# Patient Record
Sex: MALE | Race: WHITE | Employment: UNEMPLOYED | URBAN - METROPOLITAN AREA
[De-identification: names, ages, dates, MRNs, and addresses within clinical notes are randomized per-mention and may not be internally consistent; named-entity substitution may affect disease eponyms.]

---

## 2017-03-08 ENCOUNTER — ED HISTORICAL/CONVERTED ENCOUNTER (OUTPATIENT)
Dept: OTHER | Age: 57
End: 2017-03-08

## 2017-11-14 ENCOUNTER — OP HISTORICAL/CONVERTED ENCOUNTER (OUTPATIENT)
Dept: OTHER | Age: 57
End: 2017-11-14

## 2017-12-21 ENCOUNTER — OP HISTORICAL/CONVERTED ENCOUNTER (OUTPATIENT)
Dept: OTHER | Age: 57
End: 2017-12-21

## 2018-09-05 ENCOUNTER — ED HISTORICAL/CONVERTED ENCOUNTER (OUTPATIENT)
Dept: OTHER | Age: 58
End: 2018-09-05

## 2020-01-21 ENCOUNTER — OFFICE VISIT (OUTPATIENT)
Dept: NEUROLOGY | Age: 60
End: 2020-01-21

## 2020-01-21 VITALS
OXYGEN SATURATION: 62 % | WEIGHT: 209 LBS | HEART RATE: 94 BPM | HEIGHT: 70 IN | BODY MASS INDEX: 29.92 KG/M2 | SYSTOLIC BLOOD PRESSURE: 125 MMHG | DIASTOLIC BLOOD PRESSURE: 62 MMHG

## 2020-01-21 DIAGNOSIS — F43.10 PTSD (POST-TRAUMATIC STRESS DISORDER): ICD-10-CM

## 2020-01-21 DIAGNOSIS — F90.0 ATTENTION DEFICIT HYPERACTIVITY DISORDER (ADHD), PREDOMINANTLY INATTENTIVE TYPE: ICD-10-CM

## 2020-01-21 DIAGNOSIS — G25.81 RLS (RESTLESS LEGS SYNDROME): ICD-10-CM

## 2020-01-21 DIAGNOSIS — E78.49 OTHER HYPERLIPIDEMIA: ICD-10-CM

## 2020-01-21 DIAGNOSIS — R41.3 MEMORY LOSS: Primary | ICD-10-CM

## 2020-01-21 DIAGNOSIS — G47.19 EXCESSIVE DAYTIME SLEEPINESS: ICD-10-CM

## 2020-01-21 RX ORDER — PAROXETINE HYDROCHLORIDE 40 MG/1
40 TABLET, FILM COATED ORAL DAILY
COMMUNITY
End: 2022-07-11 | Stop reason: ALTCHOICE

## 2020-01-21 RX ORDER — GABAPENTIN 300 MG/1
600 CAPSULE ORAL 2 TIMES DAILY
COMMUNITY

## 2020-01-21 RX ORDER — ATORVASTATIN CALCIUM 80 MG/1
80 TABLET, FILM COATED ORAL DAILY
COMMUNITY

## 2020-01-21 RX ORDER — DEXTROAMPHETAMINE SACCHARATE, AMPHETAMINE ASPARTATE, DEXTROAMPHETAMINE SULFATE AND AMPHETAMINE SULFATE 5; 5; 5; 5 MG/1; MG/1; MG/1; MG/1
20 TABLET ORAL
COMMUNITY
End: 2021-02-25

## 2020-01-21 RX ORDER — OMEPRAZOLE 40 MG/1
40 CAPSULE, DELAYED RELEASE ORAL DAILY
COMMUNITY

## 2020-01-21 RX ORDER — NALTREXONE HYDROCHLORIDE 50 MG/1
50 TABLET, FILM COATED ORAL DAILY
COMMUNITY
End: 2020-08-21

## 2020-01-21 NOTE — PATIENT INSTRUCTIONS

## 2020-01-21 NOTE — PROGRESS NOTES
NEUROLOGY HISTORY AND PHYSICAL    Name Darryn Bowser Age 56 y.o. MRN [id number]  1960     Referring Physician: Sammy Hughes MD      Chief Complaint:  narcolepsy     This is a 56 y.o. Right handed male with a medical history of hyperlipidmeia. He was diagnosed with narcolepsy at the [place]. He is enrolled in a ptsd study and he was diagnosed with poor memory and was told that it may be medication related. We discussed his attention deficit hyperactivity disorder in relationship to his PTSD as well as his medications. Discussed exercises to help him with focus including breaking up his tasks to 15 minutes and reviewing his progress. He also agreed to limit his task lists to 5 at a time and not to move on until he is completed tasks at hand. He has episodes of sudden loss of memory he may be talking and then forget what he was talking about. Impression and Plan  1. Memory loss  His Adderall. - EEG; Future    2. Attention deficit hyperactivity disorder (ADHD), predominantly inattentive type  Reduce Adderall to 10 mg daily    3. PTSD (post-traumatic stress disorder)  Continue Paxil and trazodone    4. RLS (restless legs syndrome)  Continue pramipexole    5. Other hyperlipidemia  Continue Zetia    6. Excessive daytime sleepiness  Continue Adderall        Current Outpatient Medications   Medication Sig    atorvastatin (LIPITOR) 80 mg tablet Take 80 mg by mouth daily.  gabapentin (NEURONTIN) 300 mg capsule Take 300 mg by mouth three (3) times daily.  dextroamphetamine-amphetamine (ADDERALL) 20 mg tablet Take 20 mg by mouth.  PARoxetine (PAXIL) 40 mg tablet Take 40 mg by mouth daily.  omeprazole (PRILOSEC) 40 mg capsule Take 40 mg by mouth daily.  naltrexone (DEPADE) 50 mg tablet Take 50 mg by mouth daily.  cetirizine (ZYRTEC) 10 mg tablet Take  by mouth daily.  pramipexole (MIRAPEX) 0.5 mg tablet Take 0.5 mg by mouth.  trazodone (DESYREL) 50 mg tablet Take 150 mg by mouth.  PV W-O NEW/FERROUS FUMARATE/FA (M-VIT PO) Take  by mouth. No current facility-administered medications for this visit. Past Medical History:   Diagnosis Date    CAD (coronary artery disease)     Candidal esophagitis ([facility] [address] 2008    Chronic rhinitis     Headache(784.0)     Hypercholesterolemia     RLS (restless legs syndrome)     Sleep apnea      Social History     Tobacco Use    Smoking status: Not on file   Substance Use Topics    Alcohol use: Not on file    Drug use: Not on file     Review of Systems   Constitutional: Negative for chills and fever. HENT: Negative for ear pain. Eyes: Negative for pain and discharge. Respiratory: Negative for cough and hemoptysis. Cardiovascular: Negative for chest pain and claudication. Gastrointestinal: Negative for constipation and diarrhea. Genitourinary: Negative for flank pain and hematuria. Musculoskeletal: Positive for back pain and myalgias. Skin: Negative for itching and rash. Neurological: Negative for headaches. Endo/Heme/Allergies: Negative for environmental allergies. Does not bruise/bleed easily. Psychiatric/Behavioral: Positive for depression, memory loss and suicidal ideas. Negative for hallucinations and substance abuse. The patient is nervous/anxious. Exam  Visit Vitals  /62   Pulse 94   Ht 5' 10\" (1.778 m)   Wt 209 lb (94.8 kg)   SpO2 (!) 62%   BMI 29.99 kg/m²         General: Well developed, well nourished. Patient in no apparent distress   Head: Normocephalic, atraumatic, anicteric sclera   Neck Normal ROM, No thyromegally   Lungs:  Clear to auscultation bilaterally, No wheezes or rubs   Cardiac: Regular rate and rhythm with no murmurs. Abd: Bowel sounds were audible. No tenderness on palpation   Ext: No pedal edema   Skin: Supple no rash     NeurologicExam:  Mental Status: Alert and oriented to person place and time   Speech: Fluent no aphasia or dysarthria.    Cranial Nerves:  II-XII intact   Motor: Full and symmetric strength of upper and lower ext . Normal bulk and tone. Reflexes:   Deep tendon reflexes 2+/4 and symmetric. Sensory:   Symmetric and intact    Gait:  Gait is antalgic    Tremor:   No tremor noted. Cerebellar:  Coordination intact. Neurovascular: No carotid bruits.  No JVD   60  minutes spent more than 50% spent in chart review and face to face  examination, discussion of treatment options and approach    Reviewed his last use neuropsych examinations and summarized as follows severe ADHD

## 2020-02-11 ENCOUNTER — HOSPITAL ENCOUNTER (OUTPATIENT)
Dept: NEUROLOGY | Age: 60
Discharge: HOME | End: 2020-02-11
Attending: PSYCHIATRY & NEUROLOGY
Payer: MEDICARE

## 2020-02-11 DIAGNOSIS — R41.3 MEMORY LOSS: ICD-10-CM

## 2020-02-11 PROCEDURE — 95816 EEG AWAKE AND DROWSY: CPT

## 2020-02-12 NOTE — PROCEDURES
[facility]  EEG    Name:  Soheila Esposito  MR#:  [id number]  :  1960  ACCOUNT #:  [de-identified]  DATE OF SERVICE:  2020      REFERRING PROVIDER:  Tess Loza MD    CLINICAL HISTORY:  An EEG is requested in this 69-year-old man to evaluate for epileptiform abnormalities. MEDICATIONS:  Listed as Paxil, Adderall, Neurontin, Lipitor. EEG REPORT:  This tracing is obtained during the awake state. During wakefulness, the background consists of diffuse low-voltage fast-frequency beta-wave activity. Hyperventilation is performed for 3 minutes with good effort and little alters the tracing. Intermittent photic stimulation induces symmetric posterior driving responses. Sleep is not attained    INTERPRETATION:  This EEG recorded during the awake state is normal.  No epileptiform abnormalities are seen.         Vipul Mercado MD      [id number]  D:  2020 13:51  T:  2020 22:49  JOB #:  [id number]

## 2020-04-14 ENCOUNTER — VIRTUAL VISIT (OUTPATIENT)
Dept: NEUROLOGY | Age: 60
End: 2020-04-14

## 2020-04-14 DIAGNOSIS — E78.49 OTHER HYPERLIPIDEMIA: ICD-10-CM

## 2020-04-14 DIAGNOSIS — R41.3 MEMORY LOSS: Primary | ICD-10-CM

## 2020-04-14 DIAGNOSIS — F90.0 ATTENTION DEFICIT HYPERACTIVITY DISORDER (ADHD), PREDOMINANTLY INATTENTIVE TYPE: ICD-10-CM

## 2020-04-14 DIAGNOSIS — G25.81 RLS (RESTLESS LEGS SYNDROME): ICD-10-CM

## 2020-04-14 DIAGNOSIS — F43.10 PTSD (POST-TRAUMATIC STRESS DISORDER): ICD-10-CM

## 2020-04-14 NOTE — PATIENT INSTRUCTIONS
Office Policies · Phone calls/patient messages: Please allow up to 24 hours for someone in the office to contact you about your call or message. Be mindful your provider may be out of the office or your message may require further review. We encourage you to use [employer] network for your messages as this is a faster, more efficient way to communicate with our office · Medication Refills: 
Prescription medications require up to 48 business hours to process. We encourage you to use [employer] network for your refills. For controlled medications: Please allow up to 72 business hours to process. Certain medications may require you to  a written prescription at our office. NO narcotic/controlled medications will be prescribed after 4pm Monday through Friday or on weekends · Form/Paperwork Completion: 
Please note there is a $25 fee for all paperwork completed by our providers. We ask that you allow 7-14 business days. Pre-payment is due prior to picking up/faxing the completed form. You may also download your forms to [employer] network to have your doctor print off.

## 2020-04-14 NOTE — PROGRESS NOTES
Follow-up Visit    Name Destiney Lisa Age 56 y.o. MRN [id number]  1960       Chief Complaint: Memory loss    Since he was last seen he has been attempting to employ the 15-minute documentation finds this tedious. Acknowledges that it has given him insight into reason for his distraction and the reason why he does not stay on task. He states that some days it does not work for him. Spoke with his wife his wife knowledges that after his last visit he spent 15 days implying this tactic successfully and accomplished more and less time than he has usually accomplished in the same amount of time despite spending time documenting activities. 1 thing this has not helped with is losing track the course of his conversations and I tried explaining this in light of his distraction while completing tasks. Encouraged him to stay at goal oriented as opposed to the task oriented. He said he would be more happier documenting things every 30 minutes as opposed every 15 minutes told him to try this for the future. Assesment and Plan  1. Memory loss  As discussed above    2. Attention deficit hyperactivity disorder (ADHD), predominantly inattentive type  Continue Adderall    3. PTSD (post-traumatic stress disorder)      4. RLS (restless legs syndrome)  Continue pramipexole    5. Other hyperlipidemia  Continue atorvastatin      Allergies  Aleve [naproxen sodium]     Medications  Current Outpatient Medications   Medication Sig    atorvastatin (LIPITOR) 80 mg tablet Take 80 mg by mouth daily.  gabapentin (NEURONTIN) 300 mg capsule Take 100 mg by mouth three (3) times daily.  dextroamphetamine-amphetamine (ADDERALL) 20 mg tablet Take 20 mg by mouth.  PARoxetine (PAXIL) 40 mg tablet Take 40 mg by mouth daily.  omeprazole (PRILOSEC) 40 mg capsule Take 40 mg by mouth daily.  naltrexone (DEPADE) 50 mg tablet Take 50 mg by mouth daily.  cetirizine (ZYRTEC) 10 mg tablet Take  by mouth daily.     pramipexole (MIRAPEX) 0.5 mg tablet Take 0.5 mg by mouth.  trazodone (DESYREL) 50 mg tablet Take 150 mg by mouth.  PV W-O NEW/FERROUS FUMARATE/FA (M-VIT PO) Take  by mouth. No current facility-administered medications for this visit. Medical History  Past Medical History:   Diagnosis Date    CAD (coronary artery disease)     Candidal esophagitis ([facility] [address] 2008    Chronic rhinitis     Headache(784.0)     Hypercholesterolemia     RLS (restless legs syndrome)     Sleep apnea        Review of Systems   Eyes: Negative for blurred vision and double vision. Respiratory: Negative for cough and shortness of breath. Cardiovascular: Negative for chest pain, palpitations and orthopnea. Gastrointestinal: Negative for nausea and vomiting. Neurological: Negative for dizziness and headaches. Psychiatric/Behavioral: Positive for memory loss. Negative for depression. The patient is nervous/anxious. Exam:  There were no vitals taken for this visit. General: Well developed, well nourished. Patient in no apparent distress   Head: Normocephalic, atraumatic, anicteric sclera   Neck Normal ROM, No thyromegally   Lungs:  Clear to auscultation bilaterally, No wheezes or rubs   Cardiac: Regular rate and rhythm with no murmurs. Abd: Bowel sounds were audible. No tenderness on palpation   Ext: No pedal edema   Skin: Supple no rash     NeurologicExam:  Mental Status: Alert and oriented to person place and time   Speech: Fluent no aphasia or dysarthria. Cranial Nerves:  II - XII Intact   Motor:  Full and symmetric strength of upper and lower extremities. normal bulk. Sensory:   Symmetric and intact . Gait:  Gait is balanced and fluid with normal arm swing. Tremor:   No tremor noted. Cerebellar:  Coordination intact. Neurovascular: No carotid bruits.  No JVD        45 minutes spent more than 50% spent in chart review and face to face  examination, discussion of treatment options and approach                   Rosie Ward was seen by synchronous (real-time) audio-video technology on 04/14/20. Consent:  He and/or his healthcare decision maker is aware that this patient-initiated Telehealth encounter is a billable service, with coverage as determined by his insurance carrier. He is aware that he may receive a bill and has provided verbal consent to proceed: Yes    I was in the office while conducting this encounter. Pursuant to the emergency declaration under the [facility] waiver authority and the [employer] Act, this Virtual  Visit was conducted, with patient's consent, to reduce the patient's risk of exposure to COVID-19 and provide continuity of care for an established patient. Services were provided through a video synchronous discussion virtually to substitute for in-person clinic visit.

## 2020-05-27 ENCOUNTER — ED HISTORICAL/CONVERTED ENCOUNTER (OUTPATIENT)
Dept: OTHER | Age: 60
End: 2020-05-27

## 2020-08-20 ENCOUNTER — VIRTUAL VISIT (OUTPATIENT)
Dept: NEUROLOGY | Age: 60
End: 2020-08-20
Payer: MEDICARE

## 2020-08-20 DIAGNOSIS — F43.10 PTSD (POST-TRAUMATIC STRESS DISORDER): ICD-10-CM

## 2020-08-20 DIAGNOSIS — E78.49 OTHER HYPERLIPIDEMIA: ICD-10-CM

## 2020-08-20 DIAGNOSIS — G25.81 RLS (RESTLESS LEGS SYNDROME): ICD-10-CM

## 2020-08-20 DIAGNOSIS — F90.0 ATTENTION DEFICIT HYPERACTIVITY DISORDER (ADHD), PREDOMINANTLY INATTENTIVE TYPE: Primary | ICD-10-CM

## 2020-08-20 PROCEDURE — G8419 CALC BMI OUT NRM PARAM NOF/U: HCPCS | Performed by: PSYCHIATRY & NEUROLOGY

## 2020-08-20 PROCEDURE — G9717 DOC PT DX DEP/BP F/U NT REQ: HCPCS | Performed by: PSYCHIATRY & NEUROLOGY

## 2020-08-20 PROCEDURE — G8428 CUR MEDS NOT DOCUMENT: HCPCS | Performed by: PSYCHIATRY & NEUROLOGY

## 2020-08-20 PROCEDURE — 3017F COLORECTAL CA SCREEN DOC REV: CPT | Performed by: PSYCHIATRY & NEUROLOGY

## 2020-08-20 PROCEDURE — 99214 OFFICE O/P EST MOD 30 MIN: CPT | Performed by: PSYCHIATRY & NEUROLOGY

## 2020-08-20 RX ORDER — UREA 10 %
100 LOTION (ML) TOPICAL DAILY
COMMUNITY

## 2020-08-20 RX ORDER — LIDOCAINE 50 MG/G
PATCH TOPICAL EVERY 24 HOURS
COMMUNITY

## 2020-08-20 RX ORDER — GLUCOSAMINE SULFATE 1500 MG
POWDER IN PACKET (EA) ORAL DAILY
COMMUNITY

## 2020-08-20 NOTE — PROGRESS NOTES
Follow-up Visit    Name Yany Leong Age 56 y.o. MRN [id number]  1960       Chief Complaint: Memory loss  Recently over a stomach bug for a few days. :lasted three days. His memory has not been good. He is trying to get into the habit but has a hard time focusing. He feels making lists help but some days he will stray from this. He was disturbed when it was suggested that he may be a sociopath by an acquaintance and a psychologist. I reassured him that I did not think he was and that his tendencies did not seem to lean in that direction. Assesment and Plan  1. Memory loss  As discussed above    2. Attention deficit hyperactivity disorder (ADHD), predominantly inattentive type  Continue Adderall    3. PTSD (post-traumatic stress disorder)    4. RLS (restless legs syndrome)  Continue pramipexole    5. Other hyperlipidemia  Continue atorvastatin      Allergies  Aleve [naproxen sodium]; Clindamycin; and Doxycycline     Medications  Current Outpatient Medications   Medication Sig    cyanocobalamin (Vitamin B-12) 100 mcg tablet Take 100 mcg by mouth daily.  cholecalciferol (Vitamin D3) 25 mcg (1,000 unit) cap Take  by mouth daily.  lidocaine (LIDODERM) 5 % by TransDERmal route every twenty-four (24) hours. Apply patch to the affected area for 12 hours a day and remove for 12 hours a day.  atorvastatin (LIPITOR) 80 mg tablet Take 80 mg by mouth daily.  gabapentin (NEURONTIN) 300 mg capsule Take 100 mg by mouth three (3) times daily.  dextroamphetamine-amphetamine (ADDERALL) 20 mg tablet Take 20 mg by mouth.  PARoxetine (PAXIL) 40 mg tablet Take 40 mg by mouth daily.  omeprazole (PRILOSEC) 40 mg capsule Take 40 mg by mouth daily.  cetirizine (ZYRTEC) 10 mg tablet Take  by mouth daily.  pramipexole (MIRAPEX) 0.5 mg tablet Take 0.5 mg by mouth.  trazodone (DESYREL) 50 mg tablet Take 150 mg by mouth.  PV W-O NEW/FERROUS FUMARATE/FA (M-VIT PO) Take  by mouth.     naltrexone (DEPADE) 50 mg tablet Take 50 mg by mouth daily. No current facility-administered medications for this visit. Medical History  Past Medical History:   Diagnosis Date    CAD (coronary artery disease)     Candidal es[facility]itis ([facility] [address] 2008    Chronic rhinitis     Headache(784.0)     Hypercholesterolemia     RLS (restless legs syndrome)     Sleep apnea        Review of Systems   Eyes: Negative for blurred vision and double vision. Respiratory: Negative for cough and shortness of breath. Cardiovascular: Negative for chest pain, palpitations and orthopnea. Gastrointestinal: Negative for nausea and vomiting. Neurological: Negative for dizziness and headaches. Psychiatric/Behavioral: Positive for memory loss. Negative for depression. The patient is nervous/anxious. Exam:      General: Well developed, well nourished. Patient in no apparent distress   Head: Normocephalic, atraumatic, anicteric sclera   Neck Normal ROM, No thyromegally   Lungs:  Clear to auscultation bilaterally, No wheezes or rubs   Cardiac: Regular rate and rhythm with no murmurs. Abd: Bowel sounds were audible. No tenderness on palpation   Ext: No pedal edema   Skin: Supple no rash     NeurologicExam:  Mental Status: Alert and oriented to person place and time   Speech: Fluent no aphasia or dysarthria. Cranial Nerves:  II - XII Intact   Motor:  Full and symmetric strength of upper and lower extremities. normal bulk. Sensory:   Symmetric and intact . Gait:  Gait is balanced and fluid with normal arm swing. Tremor:   No tremor noted. Cerebellar:  Coordination intact. Neurovascular: No carotid bruits. No JVD                           Aston Jones was seen by synchronous (real-time) audio-video technology on 08/20/20. Consent:  He and/or his healthcare decision maker is aware that this patient-initiated Telehealth encounter is a billable service, with coverage as determined by his insurance carrier.  He is aware that he may receive a bill and has provided verbal consent to proceed: Yes    I was in the office while conducting this encounter. Pursuant to the emergency declaration under the [facility], [facility] waiver authority and the [employer] Act, this Virtual  Visit was conducted, with patient's consent, to reduce the patient's risk of exposure to COVID-19 and provide continuity of care for an established patient. Services were provided through a video synchronous discussion virtually to substitute for in-person clinic visit.

## 2020-08-21 ENCOUNTER — HOSPITAL ENCOUNTER (OUTPATIENT)
Dept: PREADMISSION TESTING | Age: 60
Discharge: HOME | End: 2020-08-21
Payer: MEDICARE

## 2020-08-21 VITALS
SYSTOLIC BLOOD PRESSURE: 114 MMHG | DIASTOLIC BLOOD PRESSURE: 74 MMHG | BODY MASS INDEX: 28.37 KG/M2 | TEMPERATURE: 97.1 F | WEIGHT: 198.19 LBS | HEIGHT: 70 IN | HEART RATE: 62 BPM

## 2020-08-21 LAB
ATRIAL RATE: 58 BPM
BASOPHILS # BLD: 0.1 K/UL (ref 0–0.1)
BASOPHILS NFR BLD: 1 % (ref 0–1)
CALCULATED P AXIS, ECG09: 28 DEGREES
CALCULATED R AXIS, ECG10: 38 DEGREES
CALCULATED T AXIS, ECG11: 26 DEGREES
DIAGNOSIS, 93000: NORMAL
DIFFERENTIAL METHOD BLD: NORMAL
EOSINOPHIL # BLD: 0.3 K/UL (ref 0–0.4)
EOSINOPHIL NFR BLD: 3 % (ref 0–7)
ERYTHROCYTE [DISTWIDTH] IN BLOOD BY AUTOMATED COUNT: 11.7 % (ref 11.5–14.5)
HCT VFR BLD AUTO: 46.9 % (ref 36.6–50.3)
HGB BLD-MCNC: 15.3 G/DL (ref 12.1–17)
IMM GRANULOCYTES # BLD AUTO: 0 K/UL (ref 0–0.04)
IMM GRANULOCYTES NFR BLD AUTO: 0 % (ref 0–0.5)
LYMPHOCYTES # BLD: 1.2 K/UL (ref 0.8–3.5)
LYMPHOCYTES NFR BLD: 15 % (ref 12–49)
MCH RBC QN AUTO: 31.5 PG (ref 26–34)
MCHC RBC AUTO-ENTMCNC: 32.6 G/DL (ref 30–36.5)
MCV RBC AUTO: 96.5 FL (ref 80–99)
MONOCYTES # BLD: 0.5 K/UL (ref 0–1)
MONOCYTES NFR BLD: 6 % (ref 5–13)
NEUTS SEG # BLD: 5.6 K/UL (ref 1.8–8)
NEUTS SEG NFR BLD: 75 % (ref 32–75)
NRBC # BLD: 0 K/UL (ref 0–0.01)
NRBC BLD-RTO: 0 PER 100 WBC
P-R INTERVAL, ECG05: 238 MS
PLATELET # BLD AUTO: 230 K/UL (ref 150–400)
PMV BLD AUTO: 10 FL (ref 8.9–12.9)
Q-T INTERVAL, ECG07: 402 MS
QRS DURATION, ECG06: 98 MS
QTC CALCULATION (BEZET), ECG08: 394 MS
RBC # BLD AUTO: 4.86 M/UL (ref 4.1–5.7)
VENTRICULAR RATE, ECG03: 58 BPM
WBC # BLD AUTO: 7.6 K/UL (ref 4.1–11.1)

## 2020-08-21 PROCEDURE — 93005 ELECTROCARDIOGRAM TRACING: CPT

## 2020-08-21 PROCEDURE — 36415 COLL VENOUS BLD VENIPUNCTURE: CPT

## 2020-08-21 PROCEDURE — 85025 COMPLETE CBC W/AUTO DIFF WBC: CPT

## 2020-08-21 RX ORDER — MONTELUKAST SODIUM 10 MG/1
10 TABLET ORAL EVERY EVENING
COMMUNITY
End: 2021-12-07

## 2020-08-21 NOTE — PERIOP NOTES
Patient given surgical site infection information FAQs handout and hand hygiene tips sheet. Pre-operative instructions reviewed and patient verbalizes understanding of instructions. Patient has been given the opportunity to ask additional questions. PATIENT GIVEN 2 BOTTLES OF CHG SOAP TO USE DAY BEFORE SURGERY AND DOS. INSTRUCTIONS PROVIDED. Pre-Operative Instructions    DO NOT EAT OR DRINK ANYTHING AFTER MIDNIGHT THE NIGHT BEFORE SURGERY. PT INSTRUCTED TO GO TO CELL PHONE LOT DOS AND IS SCHEDULED FOR COVID TESTING.

## 2020-08-27 ENCOUNTER — HOSPITAL ENCOUNTER (OUTPATIENT)
Dept: PREADMISSION TESTING | Age: 60
Discharge: HOME | End: 2020-08-27
Payer: MEDICARE

## 2020-08-27 DIAGNOSIS — Z01.812 PRE-PROCEDURE LAB EXAM: ICD-10-CM

## 2020-08-27 PROCEDURE — 87635 SARS-COV-2 COVID-19 AMP PRB: CPT

## 2020-08-28 ENCOUNTER — ANESTHESIA EVENT (OUTPATIENT)
Dept: SURGERY | Age: 60
End: 2020-08-28
Payer: MEDICARE

## 2020-08-28 LAB — SARS-COV-2, COV2NT: NOT DETECTED

## 2020-08-28 RX ORDER — MORPHINE SULFATE 10 MG/ML
2 INJECTION, SOLUTION INTRAMUSCULAR; INTRAVENOUS
Status: CANCELLED | OUTPATIENT
Start: 2020-08-28

## 2020-08-28 RX ORDER — SODIUM CHLORIDE, SODIUM LACTATE, POTASSIUM CHLORIDE, CALCIUM CHLORIDE 600; 310; 30; 20 MG/100ML; MG/100ML; MG/100ML; MG/100ML
1000 INJECTION, SOLUTION INTRAVENOUS CONTINUOUS
Status: CANCELLED | OUTPATIENT
Start: 2020-08-28

## 2020-08-28 RX ORDER — HYDROMORPHONE HYDROCHLORIDE 1 MG/ML
0.2 INJECTION, SOLUTION INTRAMUSCULAR; INTRAVENOUS; SUBCUTANEOUS
Status: CANCELLED | OUTPATIENT
Start: 2020-08-28

## 2020-08-28 RX ORDER — MIDAZOLAM HYDROCHLORIDE 1 MG/ML
0.5 INJECTION, SOLUTION INTRAMUSCULAR; INTRAVENOUS
Status: CANCELLED | OUTPATIENT
Start: 2020-08-28

## 2020-08-28 RX ORDER — HYDROCODONE BITARTRATE AND ACETAMINOPHEN 5; 325 MG/1; MG/1
1 TABLET ORAL AS NEEDED
Status: CANCELLED | OUTPATIENT
Start: 2020-08-28

## 2020-08-28 RX ORDER — SODIUM CHLORIDE 9 MG/ML
25 INJECTION, SOLUTION INTRAVENOUS CONTINUOUS
Status: CANCELLED | OUTPATIENT
Start: 2020-08-28

## 2020-08-28 RX ORDER — DIPHENHYDRAMINE HYDROCHLORIDE 50 MG/ML
12.5 INJECTION, SOLUTION INTRAMUSCULAR; INTRAVENOUS AS NEEDED
Status: CANCELLED | OUTPATIENT
Start: 2020-08-28 | End: 2020-08-28

## 2020-08-28 RX ORDER — FENTANYL CITRATE 50 UG/ML
25 INJECTION, SOLUTION INTRAMUSCULAR; INTRAVENOUS
Status: CANCELLED | OUTPATIENT
Start: 2020-08-28

## 2020-08-28 RX ORDER — ALBUTEROL SULFATE 0.83 MG/ML
2.5 SOLUTION RESPIRATORY (INHALATION) AS NEEDED
Status: CANCELLED | OUTPATIENT
Start: 2020-08-28

## 2020-08-28 RX ORDER — ONDANSETRON 2 MG/ML
4 INJECTION INTRAMUSCULAR; INTRAVENOUS AS NEEDED
Status: CANCELLED | OUTPATIENT
Start: 2020-08-28

## 2020-08-31 ENCOUNTER — ANESTHESIA (OUTPATIENT)
Dept: SURGERY | Age: 60
End: 2020-08-31
Payer: MEDICARE

## 2020-08-31 ENCOUNTER — HOSPITAL ENCOUNTER (OUTPATIENT)
Age: 60
Setting detail: OUTPATIENT SURGERY
Discharge: HOME | End: 2020-08-31
Attending: OTOLARYNGOLOGY | Admitting: OTOLARYNGOLOGY
Payer: MEDICARE

## 2020-08-31 VITALS
TEMPERATURE: 97.5 F | RESPIRATION RATE: 19 BRPM | SYSTOLIC BLOOD PRESSURE: 144 MMHG | HEIGHT: 70 IN | OXYGEN SATURATION: 94 % | BODY MASS INDEX: 28.35 KG/M2 | DIASTOLIC BLOOD PRESSURE: 88 MMHG | HEART RATE: 57 BPM | WEIGHT: 198 LBS

## 2020-08-31 DIAGNOSIS — J32.9 CHRONIC RECURRENT SINUSITIS: Primary | ICD-10-CM

## 2020-08-31 DIAGNOSIS — J33.9 NASAL POLYPOSIS: ICD-10-CM

## 2020-08-31 PROCEDURE — 74011250636 HC RX REV CODE- 250/636: Performed by: NURSE ANESTHETIST, CERTIFIED REGISTERED

## 2020-08-31 PROCEDURE — 77030008684 HC TU ET CUF COVD -B: Performed by: ANESTHESIOLOGY

## 2020-08-31 PROCEDURE — 76060000033 HC ANESTHESIA 1 TO 1.5 HR: Performed by: OTOLARYNGOLOGY

## 2020-08-31 PROCEDURE — 74011250636 HC RX REV CODE- 250/636: Performed by: ANESTHESIOLOGY

## 2020-08-31 PROCEDURE — 76210000020 HC REC RM PH II FIRST 0.5 HR: Performed by: OTOLARYNGOLOGY

## 2020-08-31 PROCEDURE — 74011250637 HC RX REV CODE- 250/637: Performed by: OTOLARYNGOLOGY

## 2020-08-31 PROCEDURE — 74011000250 HC RX REV CODE- 250: Performed by: NURSE ANESTHETIST, CERTIFIED REGISTERED

## 2020-08-31 PROCEDURE — 77030040361 HC SLV COMPR DVT MDII -B: Performed by: OTOLARYNGOLOGY

## 2020-08-31 PROCEDURE — 74011000250 HC RX REV CODE- 250: Performed by: OTOLARYNGOLOGY

## 2020-08-31 PROCEDURE — 74011250637 HC RX REV CODE- 250/637: Performed by: ANESTHESIOLOGY

## 2020-08-31 PROCEDURE — 76010000149 HC OR TIME 1 TO 1.5 HR: Performed by: OTOLARYNGOLOGY

## 2020-08-31 PROCEDURE — 88305 TISSUE EXAM BY PATHOLOGIST: CPT

## 2020-08-31 PROCEDURE — 76210000006 HC OR PH I REC 0.5 TO 1 HR: Performed by: OTOLARYNGOLOGY

## 2020-08-31 PROCEDURE — 77030040922 HC BLNKT HYPOTHRM STRY -A

## 2020-08-31 PROCEDURE — 77030028681 HC DRSG NSL ABSRB NASOPORE STRY -C: Performed by: OTOLARYNGOLOGY

## 2020-08-31 RX ORDER — DEXAMETHASONE SODIUM PHOSPHATE 4 MG/ML
INJECTION, SOLUTION INTRA-ARTICULAR; INTRALESIONAL; INTRAMUSCULAR; INTRAVENOUS; SOFT TISSUE AS NEEDED
Status: DISCONTINUED | OUTPATIENT
Start: 2020-08-31 | End: 2020-08-31 | Stop reason: HOSPADM

## 2020-08-31 RX ORDER — SODIUM CHLORIDE 0.9 % (FLUSH) 0.9 %
5-40 SYRINGE (ML) INJECTION AS NEEDED
Status: DISCONTINUED | OUTPATIENT
Start: 2020-08-31 | End: 2020-08-31 | Stop reason: HOSPADM

## 2020-08-31 RX ORDER — SODIUM CHLORIDE 0.9 % (FLUSH) 0.9 %
5-40 SYRINGE (ML) INJECTION EVERY 8 HOURS
Status: CANCELLED | OUTPATIENT
Start: 2020-08-31

## 2020-08-31 RX ORDER — SODIUM CHLORIDE, SODIUM LACTATE, POTASSIUM CHLORIDE, CALCIUM CHLORIDE 600; 310; 30; 20 MG/100ML; MG/100ML; MG/100ML; MG/100ML
INJECTION, SOLUTION INTRAVENOUS
Status: DISCONTINUED | OUTPATIENT
Start: 2020-08-31 | End: 2020-08-31 | Stop reason: HOSPADM

## 2020-08-31 RX ORDER — BACITRACIN 500 [USP'U]/G
OINTMENT TOPICAL ONCE
Status: COMPLETED | OUTPATIENT
Start: 2020-08-31 | End: 2020-08-31

## 2020-08-31 RX ORDER — SUCCINYLCHOLINE CHLORIDE 20 MG/ML
INJECTION INTRAMUSCULAR; INTRAVENOUS AS NEEDED
Status: DISCONTINUED | OUTPATIENT
Start: 2020-08-31 | End: 2020-08-31 | Stop reason: HOSPADM

## 2020-08-31 RX ORDER — FENTANYL CITRATE 50 UG/ML
INJECTION, SOLUTION INTRAMUSCULAR; INTRAVENOUS AS NEEDED
Status: DISCONTINUED | OUTPATIENT
Start: 2020-08-31 | End: 2020-08-31 | Stop reason: HOSPADM

## 2020-08-31 RX ORDER — MIDAZOLAM HYDROCHLORIDE 1 MG/ML
1 INJECTION, SOLUTION INTRAMUSCULAR; INTRAVENOUS AS NEEDED
Status: DISCONTINUED | OUTPATIENT
Start: 2020-08-31 | End: 2020-08-31 | Stop reason: HOSPADM

## 2020-08-31 RX ORDER — CEFAZOLIN SODIUM/WATER 2 G/20 ML
2 SYRINGE (ML) INTRAVENOUS EVERY 8 HOURS
Status: CANCELLED | OUTPATIENT
Start: 2020-08-31 | End: 2020-09-01

## 2020-08-31 RX ORDER — OXYCODONE AND ACETAMINOPHEN 5; 325 MG/1; MG/1
1 TABLET ORAL
Qty: 20 TAB | Refills: 0 | Status: SHIPPED | OUTPATIENT
Start: 2020-08-31 | End: 2020-09-07

## 2020-08-31 RX ORDER — ACETAMINOPHEN 325 MG/1
650 TABLET ORAL ONCE
Status: COMPLETED | OUTPATIENT
Start: 2020-08-31 | End: 2020-08-31

## 2020-08-31 RX ORDER — GLYCOPYRROLATE 0.2 MG/ML
INJECTION INTRAMUSCULAR; INTRAVENOUS AS NEEDED
Status: DISCONTINUED | OUTPATIENT
Start: 2020-08-31 | End: 2020-08-31 | Stop reason: HOSPADM

## 2020-08-31 RX ORDER — LIDOCAINE HYDROCHLORIDE 20 MG/ML
INJECTION, SOLUTION EPIDURAL; INFILTRATION; INTRACAUDAL; PERINEURAL AS NEEDED
Status: DISCONTINUED | OUTPATIENT
Start: 2020-08-31 | End: 2020-08-31 | Stop reason: HOSPADM

## 2020-08-31 RX ORDER — FENTANYL CITRATE 50 UG/ML
50 INJECTION, SOLUTION INTRAMUSCULAR; INTRAVENOUS AS NEEDED
Status: DISCONTINUED | OUTPATIENT
Start: 2020-08-31 | End: 2020-08-31 | Stop reason: HOSPADM

## 2020-08-31 RX ORDER — SODIUM CHLORIDE, SODIUM LACTATE, POTASSIUM CHLORIDE, CALCIUM CHLORIDE 600; 310; 30; 20 MG/100ML; MG/100ML; MG/100ML; MG/100ML
1000 INJECTION, SOLUTION INTRAVENOUS CONTINUOUS
Status: DISCONTINUED | OUTPATIENT
Start: 2020-08-31 | End: 2020-08-31 | Stop reason: HOSPADM

## 2020-08-31 RX ORDER — OXYMETAZOLINE HCL 0.05 %
2 SPRAY, NON-AEROSOL (ML) NASAL
Status: CANCELLED | OUTPATIENT
Start: 2020-08-31

## 2020-08-31 RX ORDER — CEPHALEXIN 500 MG/1
500 CAPSULE ORAL 3 TIMES DAILY
Qty: 21 CAP | Refills: 0 | Status: SHIPPED | OUTPATIENT
Start: 2020-08-31 | End: 2020-09-07

## 2020-08-31 RX ORDER — ROCURONIUM BROMIDE 10 MG/ML
INJECTION, SOLUTION INTRAVENOUS AS NEEDED
Status: DISCONTINUED | OUTPATIENT
Start: 2020-08-31 | End: 2020-08-31 | Stop reason: HOSPADM

## 2020-08-31 RX ORDER — ROPIVACAINE HYDROCHLORIDE 5 MG/ML
30 INJECTION, SOLUTION EPIDURAL; INFILTRATION; PERINEURAL AS NEEDED
Status: DISCONTINUED | OUTPATIENT
Start: 2020-08-31 | End: 2020-08-31 | Stop reason: HOSPADM

## 2020-08-31 RX ORDER — SODIUM CHLORIDE 0.9 % (FLUSH) 0.9 %
5-40 SYRINGE (ML) INJECTION EVERY 8 HOURS
Status: DISCONTINUED | OUTPATIENT
Start: 2020-08-31 | End: 2020-08-31 | Stop reason: HOSPADM

## 2020-08-31 RX ORDER — SODIUM CHLORIDE 0.9 % (FLUSH) 0.9 %
5-40 SYRINGE (ML) INJECTION AS NEEDED
Status: CANCELLED | OUTPATIENT
Start: 2020-08-31

## 2020-08-31 RX ORDER — OXYMETAZOLINE HCL 0.05 %
2 SPRAY, NON-AEROSOL (ML) NASAL ONCE
Status: COMPLETED | OUTPATIENT
Start: 2020-08-31 | End: 2020-08-31

## 2020-08-31 RX ORDER — CEFAZOLIN SODIUM 1 G/3ML
INJECTION, POWDER, FOR SOLUTION INTRAMUSCULAR; INTRAVENOUS AS NEEDED
Status: DISCONTINUED | OUTPATIENT
Start: 2020-08-31 | End: 2020-08-31 | Stop reason: HOSPADM

## 2020-08-31 RX ORDER — MORPHINE SULFATE 2 MG/ML
2 INJECTION, SOLUTION INTRAMUSCULAR; INTRAVENOUS
Status: CANCELLED | OUTPATIENT
Start: 2020-08-31

## 2020-08-31 RX ORDER — MIDAZOLAM HYDROCHLORIDE 1 MG/ML
INJECTION, SOLUTION INTRAMUSCULAR; INTRAVENOUS AS NEEDED
Status: DISCONTINUED | OUTPATIENT
Start: 2020-08-31 | End: 2020-08-31 | Stop reason: HOSPADM

## 2020-08-31 RX ORDER — OXYCODONE AND ACETAMINOPHEN 5; 325 MG/1; MG/1
1 TABLET ORAL
Status: CANCELLED | OUTPATIENT
Start: 2020-08-31

## 2020-08-31 RX ORDER — GLYCOPYRROLATE 0.2 MG/ML
0.2 INJECTION INTRAMUSCULAR; INTRAVENOUS
Status: DISCONTINUED | OUTPATIENT
Start: 2020-08-31 | End: 2020-08-31 | Stop reason: HOSPADM

## 2020-08-31 RX ORDER — NEOSTIGMINE METHYLSULFATE 1 MG/ML
INJECTION INTRAVENOUS AS NEEDED
Status: DISCONTINUED | OUTPATIENT
Start: 2020-08-31 | End: 2020-08-31 | Stop reason: HOSPADM

## 2020-08-31 RX ORDER — LIDOCAINE HYDROCHLORIDE AND EPINEPHRINE 10; 10 MG/ML; UG/ML
1.5 INJECTION, SOLUTION INFILTRATION; PERINEURAL ONCE
Status: COMPLETED | OUTPATIENT
Start: 2020-08-31 | End: 2020-08-31

## 2020-08-31 RX ORDER — ONDANSETRON 2 MG/ML
INJECTION INTRAMUSCULAR; INTRAVENOUS AS NEEDED
Status: DISCONTINUED | OUTPATIENT
Start: 2020-08-31 | End: 2020-08-31 | Stop reason: HOSPADM

## 2020-08-31 RX ORDER — LIDOCAINE HYDROCHLORIDE 10 MG/ML
0.1 INJECTION, SOLUTION EPIDURAL; INFILTRATION; INTRACAUDAL; PERINEURAL AS NEEDED
Status: DISCONTINUED | OUTPATIENT
Start: 2020-08-31 | End: 2020-08-31 | Stop reason: HOSPADM

## 2020-08-31 RX ORDER — PROPOFOL 10 MG/ML
INJECTION, EMULSION INTRAVENOUS AS NEEDED
Status: DISCONTINUED | OUTPATIENT
Start: 2020-08-31 | End: 2020-08-31 | Stop reason: HOSPADM

## 2020-08-31 RX ORDER — SODIUM CHLORIDE 9 MG/ML
25 INJECTION, SOLUTION INTRAVENOUS CONTINUOUS
Status: DISCONTINUED | OUTPATIENT
Start: 2020-08-31 | End: 2020-08-31 | Stop reason: HOSPADM

## 2020-08-31 RX ADMIN — SODIUM CHLORIDE, SODIUM LACTATE, POTASSIUM CHLORIDE, AND CALCIUM CHLORIDE 1000 ML: 600; 310; 30; 20 INJECTION, SOLUTION INTRAVENOUS at 06:39

## 2020-08-31 RX ADMIN — SODIUM CHLORIDE, POTASSIUM CHLORIDE, SODIUM LACTATE AND CALCIUM CHLORIDE: 600; 310; 30; 20 INJECTION, SOLUTION INTRAVENOUS at 07:26

## 2020-08-31 RX ADMIN — ONDANSETRON HYDROCHLORIDE 4 MG: 2 INJECTION, SOLUTION INTRAMUSCULAR; INTRAVENOUS at 08:00

## 2020-08-31 RX ADMIN — PROPOFOL 200 MG: 10 INJECTION, EMULSION INTRAVENOUS at 07:41

## 2020-08-31 RX ADMIN — MIDAZOLAM 2 MG: 1 INJECTION INTRAMUSCULAR; INTRAVENOUS at 07:35

## 2020-08-31 RX ADMIN — ACETAMINOPHEN 650 MG: 325 TABLET ORAL at 06:39

## 2020-08-31 RX ADMIN — NEOSTIGMINE METHYLSULFATE 3 MG: 1 INJECTION, SOLUTION INTRAVENOUS at 08:27

## 2020-08-31 RX ADMIN — SUCCINYLCHOLINE CHLORIDE 160 MG: 20 INJECTION, SOLUTION INTRAMUSCULAR; INTRAVENOUS at 07:41

## 2020-08-31 RX ADMIN — CEFAZOLIN 2 G: 330 INJECTION, POWDER, FOR SOLUTION INTRAMUSCULAR; INTRAVENOUS at 07:48

## 2020-08-31 RX ADMIN — ROCURONIUM BROMIDE 5 MG: 10 SOLUTION INTRAVENOUS at 07:41

## 2020-08-31 RX ADMIN — GLYCOPYRROLATE 0.4 MG: 0.2 INJECTION, SOLUTION INTRAMUSCULAR; INTRAVENOUS at 08:27

## 2020-08-31 RX ADMIN — FENTANYL CITRATE 100 MCG: 50 INJECTION, SOLUTION INTRAMUSCULAR; INTRAVENOUS at 07:41

## 2020-08-31 RX ADMIN — ROCURONIUM BROMIDE 10 MG: 10 SOLUTION INTRAVENOUS at 08:08

## 2020-08-31 RX ADMIN — DEXAMETHASONE SODIUM PHOSPHATE 10 MG: 4 INJECTION, SOLUTION INTRAMUSCULAR; INTRAVENOUS at 08:00

## 2020-08-31 RX ADMIN — LIDOCAINE HYDROCHLORIDE 100 MG: 20 INJECTION, SOLUTION EPIDURAL; INFILTRATION; INTRACAUDAL; PERINEURAL at 07:41

## 2020-08-31 NOTE — DISCHARGE INSTRUCTIONS
Patient Education        Endoscopic Sinus Surgery: What to Expect at [address] will have a drip pad under your nose to collect mucus and blood. Change it only when it bleeds through. You may have to do this every hour for 24 hours after surgery. You may have some swelling of your nose, upper lip, or cheeks, or around your eyes. Your nose may be sore and will bleed. You may feel \"stuffed up\" like you have a bad head cold. This will last for several days after surgery. The tip of your nose and your upper lip and gums may be numb. Feeling will return in a few weeks to a few months. Your sense of smell will not be as good after surgery. It will improve and probably return to normal in 1 to 2 months. You will probably be able to return to work or school in about 1 week and to your normal routine in about 3 weeks. But this varies with your job and the extent of your surgery. Most people feel normal in 1 to 2 months. You will have to visit your doctor regularly for 3 to 4 months after your surgery. Your doctor will check to see that your sinuses are healing well. This care sheet gives you a general idea about how long it will take for you to recover. But each person recovers at a different pace. Follow the steps below to get better as quickly as possible. How can you care for yourself at home? Activity  · Rest when you feel tired. Getting enough sleep will help you recover. Do not lie flat. Raise your head with three or four pillows. This can reduce swelling. Try to sleep on your back during the month after surgery. You can also sleep in a reclining chair. · Try to walk each day. Start by walking a little more than you did the day before. Bit by bit, increase the amount you walk. Walking boosts blood flow and helps prevent pneumonia and constipation. Also, try to sit and stand as much as you can. · For 1 week, try not to bend over or lift anything heavier than 10 pounds.  This may include a child, heavy grocery bags and milk containers, a heavy briefcase or backpack, cat litter or dog food bags, or a vacuum . · You can take a shower or bath. Use lukewarm, not hot water. Avoid swimming for 6 weeks. · Avoid sawdust, chemicals, and excessive dust for 4 weeks. · Avoid strenuous activities, such as biking, jogging, weight lifting, or aerobic exercise, for 1 week and then ease back into these activities over 2 to 3 weeks. · You may drive when you are no longer taking prescription pain medicine and feel up to it. · You will probably be able to return to work or school in about 1 week and to your normal routine in about 3 weeks. But this varies with your job and the extent of your surgery. Diet  · You can eat your normal diet. If your stomach is upset, try bland, low-fat foods like plain rice, broiled chicken, toast, and yogurt. · You may notice that your bowel movements are not regular right after your surgery. This is common. Try to avoid constipation and straining with bowel movements. You may want to take a fiber supplement every day. If you have not had a bowel movement after a couple of days, ask your doctor about taking a mild laxative. Medicines  · Your doctor will tell you if and when you can restart your medicines. He or she will also give you instructions about taking any new medicines. · If you take aspirin or some other blood thinner, ask your doctor if and when to start taking it again. Make sure that you understand exactly what your doctor wants you to do. · Do not take aspirin, aspirin-containing medicines, or anti-inflammatory medicines such as ibuprofen (Advil, Motrin) or naproxen (Aleve) for 3 weeks following surgery unless your doctor says it is okay. · Take pain medicines exactly as directed. ? If the doctor gave you a prescription medicine for pain, take it as prescribed. ? Do not take two or more pain medicines at the same time unless the doctor told you to.  Many pain medicines have acetaminophen, which is Tylenol. Too much acetaminophen (Tylenol) can be harmful. · If your doctor prescribed antibiotics, take them as directed. Do not stop taking them just because you feel better. You need to take the full course of antibiotics. · If you think your pain medicine is making you sick to your stomach:  ? Take your medicine after meals (unless your doctor has told you not to). ? Ask your doctor for a different pain medicine. Incision care  · You probably will not have an incision (cut). You will have a drip pad under your nose to collect blood. Change it only when it has bled through. You may have to do this every hour for 24 hours after surgery. When bleeding stops, you can remove it. · If you have packing in your nose, leave it in. Your doctor will take it out. Ice and elevation  · To help with swelling and pain, put ice or a cold pack on your nose for 10 to 20 minutes at a time. Put a thin cloth between the ice and your skin. · Do not sleep flat. Sleep with your head raised up. You can also sleep in a reclining chair. Other instructions  · Do not blow your nose for 2 weeks. · Do not put anything into your nose. · If you must sneeze, open your mouth and sneeze naturally. · Keep your mouth clean. Rinse your mouth with salt water or an alcohol-free mouthwash after each meal and before bedtime. · After any packing is removed, use saline (saltwater) nasal washes to help keep your nasal passages open and wash out mucus and bacteria. You can buy saline nose drops at a grocery store or drugstore. · Use a nasal spray containing a steroid to reduce inflammation. · Use a humidifier to keep room air moist, especially in the bedroom. · You can wear your glasses when you wish. Do not wear contacts until the day after the surgery. · Do not travel by airplane for at least 2 weeks. Your sinuses are still healing, and the changes in air pressure can affect them.   Follow-up care is a key part of your treatment and safety. Be sure to make and go to all appointments, and call your doctor if you are having problems. It's also a good idea to know your test results and keep a list of the medicines you take. When should you call for help? [id number] anytime you think you may need emergency care. For example, call if:  · You passed out (lost consciousness). · You have severe trouble breathing. · You have chest pain, have shortness of breath, or you cough up blood. Call your doctor now or seek immediate medical care if:  · You have signs of infection, such as:  ? Increased pain, swelling, warmth, or redness. ? Red streaks leading from the incision. ? Pus draining from the incision. ? A fever. · You bleed through the bandage. · You have symptoms of a blood clot in your leg (called a deep vein thrombosis), such as:  ? Pain in the calf, back of the knee, thigh, or groin. ? Redness and swelling in your leg or groin. · You have pain that does not get better after you take pain medicine. Watch closely for changes in your health, and be sure to contact your doctor if:  · You do not get better as expected. Where can you learn more? Go to http://philip-surjit.info/  Enter K577 in the search box to learn more about \"Endoscopic Sinus Surgery: What to Expect at Home. \"  Current as of: July 29, 2019               Content Version: 12.5  © 0835-2298 Healthwise, Incorporated. Care instructions adapted under license by [employer] (which disclaims liability or warranty for this information). If you have questions about a medical condition or this instruction, always ask your healthcare professional. [place] [address] any warranty or liability for your use of this information.        ______________________________________________________________________    Anesthesia Discharge Instructions    After general anesthesia or intervenous sedation, for 24 hours or while taking prescription Narcotics:  · Limit your activities  · Do not drive or operate hazardous machinery  · If you have not urinated within 8 hours after discharge, please contact your surgeon on call. · Do not make important personal or business decisions  · Do not drink alcoholic beverages    Report the following to your surgeon:  · Excessive pain, swelling, redness or odor of or around the surgical area  · Temperature over 100.5 degrees  · Nausea and vomiting lasting longer than 4 hours or if unable to take medication  · Any signs of decreased circulation or nerve impairment to extremity:  Change in color, persistent numbness, tingling, coldness or increased pain. · Any questions      Patient Education        Learning About Coronavirus [phone])  Coronavirus [phone]): Overview  What is coronavirus ([id number]-06)? The coronavirus disease (COVID-19) is caused by a virus. It is an illness that was first found in [place], [place], in December 2019. It has since spread worldwide. The virus can cause fever, cough, and trouble breathing. In severe cases, it can cause pneumonia and make it hard to breathe without help. It can cause death. Coronaviruses are a large group of viruses. They cause the common cold. They also cause more serious illnesses like Middle East respiratory syndrome (MERS) and severe acute respiratory syndrome (SARS). COVID-19 is caused by a novel coronavirus. That means it's a new type that has not been seen in people before. This virus spreads person-to-person through droplets from coughing and sneezing. It can also spread when you are close to someone who is infected. And it can spread when you touch something that has the virus on it, such as a doorknob or a tabletop. What can you do to protect yourself from coronavirus (COVID-19)? The best way to protect yourself from getting sick is to: Avoid areas where there is an outbreak. Avoid contact with people who may be infected.   Wash your hands often with soap or alcohol-based hand sanitizers. Avoid crowds and try to stay at least 6 feet away from other people. Wash your hands often, especially after you cough or sneeze. Use soap and water, and scrub for at least 20 seconds. If soap and water aren't available, use an alcohol-based hand . Avoid touching your mouth, nose, and eyes. What can you do to avoid spreading the virus to others? To help avoid spreading the virus to others:  Cover your mouth with a tissue when you cough or sneeze. Then throw the tissue in the trash. Use a disinfectant to clean things that you touch often. Wear a cloth face cover if you have to go to public areas. Stay home if you are sick or have been exposed to the virus. Don't go to school, work, or public areas. And don't use public transportation, ride-shares, or taxis unless you have no choice. If you are sick:  Leave your home only if you need to get medical care. But call the doctor's office first so they know you're coming. And wear a face cover. Wear the face cover whenever you're around other people. It can help stop the spread of the virus when you cough or sneeze. Clean and disinfect your home every day. Use household  and disinfectant wipes or sprays. Take special care to clean things that you grab with your hands. These include doorknobs, remote controls, phones, and handles on your refrigerator and microwave. And don't forget countertops, tabletops, bathrooms, and computer keyboards. When to call for help  [id number] anytime you think you may need emergency care. For example, call if:  You have severe trouble breathing. (You can't talk at all.)  You have constant chest pain or pressure. You are severely dizzy or lightheaded. You are confused or can't think clearly. Your face and lips have a blue color. You pass out (lose consciousness) or are very hard to wake up.   Call your doctor now if you develop symptoms such as:  Shortness of breath. Fever. Cough. If you need to get care, call ahead to the doctor's office for instructions before you go. Make sure you wear a face cover to prevent exposing other people to the virus. Where can you get the latest information? The following health organizations are tracking and studying this virus. Their websites contain the most up-to-date information. Tommy Chan also learn what to do if you think you may have been exposed to the virus. U.S. Centers for Disease Control and Prevention (CDC): The CDC provides updated news about the disease and travel advice. The website also tells you how to prevent the spread of infection. www.cdc.gov  World Health Organization [facility]): WHO offers information about the virus outbreaks. WHO also has travel advice. www.who.int  Current as of: May 8, 2020               Content Version: 12.5  © 4037-4375 Healthwise, Incorporated. Care instructions adapted under license by [employer] (which disclaims liability or warranty for this information). If you have questions about a medical condition or this instruction, always ask your healthcare professional. [address] any warranty or liability for your use of this information.

## 2020-08-31 NOTE — OP NOTES
[address]  OPERATIVE REPORT    Name:  Savannah Munoz  MR#:  [id number]  :  1960  ACCOUNT #:  [de-identified]  DATE OF SERVICE:  2020    PREOPERATIVE DIAGNOSES:  1. Pan nasal polyposis. 2.  Chronic recurrent sinusitis. POSTOPERATIVE DIAGNOSES:  1. Pan nasal polyposis. 2.  Chronic recurrent sinusitis. PROCEDURE PERFORMED:  1.  Bilateral endoscopic frontal sinusotomy with tissue removal.  2.  Bilateral endoscopic total ethmoidectomy. 3.  Bilateral endoscopic maxillary antrostomy with tissue removal.    SURGEON:  Daljit Tapia MD    ASSISTANT:  No assistant. ANESTHESIA:  General endotracheal tube anesthesia. COMPLICATIONS:  No complication. SPECIMENS REMOVED:  Bilateral middle meatus nasal polyps. IMPLANTS:  No implant. ESTIMATED BLOOD LOSS:  30 mL. INDICATIONS AND FINDINGS:  The patient had complete nasal obstruction, chronic recurrent sinusitis, owing to pan nasal polyposis, recalcitrant to medical and allergy management and hence indications. There were polyps obstructing the middle meatus, infundibulum, ostiomeatal unit, entire ethmoid and ethmoid cavity and nasofrontal recess, and large polyps extending out of the maxillary ostia. PROCEDURE IN DETAIL:  In the operating room, the patient was induced under general endotracheal tube anesthesia following which he was prepped and draped in a sterile fashion. 1% lidocaine with 1:100,000 part epinephrine was used for local infiltration. Afrin-soaked pledgets were placed endonasally. Combination of 0- and 30-degree endoscopes were used to perform the surgery viewed on an overhead monitor. Each middle turbinate was medialized. Blakesley forceps were used to cross biopsy of the polyps using tamponade, hemostasis with Afrin-soaked pledgets repeated throughout the case.     The uncinate process was identified and avulsed using the microdebrider to trim this at its base following this into the nasofrontal recess. The natural ostium was identified, viewed with a 30-degree scope, and probed with right-angled Olive suction. Angled grasper was used to grasp maxillary polyp preserving the mucociliary highway on both sides. The ethmoid cavity was entered inferomedially with the microdebrider coring out the honeycomb of ethmoid sinuses following the ground lamella as the medial landmark and lamina papyracea as the lateral landmark performing a dissection along the skull base in a posterior to anterior direction in a Stammberger fashion, finishing at the nasofrontal recess. Using a 30-degree endoscope, the nasofrontal recess was inspected, using Giraffe forceps to remove the anterior egg shelled floor. With this removed, the nasofrontal recess could be probed and inspected with an Olive suction into the frontal sinus completely on both sides. Upon completion, the hypopharynx was suctioned. NasoPore was placed into each nasal cavity as a spacer, and the patient was handed over to Anesthesia for awakening and transfer to the recovery room.       MD IDALMIS [email or web address]  D:  08/31/2020 8:42  T:  08/31/2020 10:13  JOB #:  [id number]  CC:  [facility]

## 2020-08-31 NOTE — ANESTHESIA POSTPROCEDURE EVALUATION
Post-Anesthesia Evaluation and Assessment    Patient: Lazarus Rich MRN: [id number]  SSN: [id number]    YOB: 1960  Age: 56 y.o. Sex: male      I have evaluated the patient and they are stable and ready for discharge from the PACU. Cardiovascular Function/Vital Signs  Visit Vitals  /88   Pulse (!) 57   Temp 36.4 °C (97.5 °F)   Resp 19   Ht 5' 10\" (1.778 m)   Wt 89.8 kg (198 lb)   SpO2 94%   BMI 28.41 kg/m²       Patient is status post General anesthesia for Procedure(s):  ENDOSCOPIC SINUS SURGERY OF FRONTAL SINUS, TOTAL ETHMOIDECTTOMY WITH TISSUE REMOVAL FROM MAXILLARY SINUS. Nausea/Vomiting: None    Postoperative hydration reviewed and adequate. Pain:  Pain Scale 1: Numeric (0 - 10) (08/31/20 0923)  Pain Intensity 1: 0 (08/31/20 0923)   Managed    Neurological Status:   Neuro (WDL): Within Defined Limits (08/31/20 0923)   At baseline    Mental Status, Level of Consciousness: Alert and  oriented to person, place, and time    Pulmonary Status:   O2 Device: Room air (08/31/20 0923)   Adequate oxygenation and airway patent    Complications related to anesthesia: None    Post-anesthesia assessment completed. No concerns    Signed By: Yesenia Avalos MD     August 31, 2020              Procedure(s):  ENDOSCOPIC SINUS SURGERY OF FRONTAL SINUS, TOTAL ETHMOIDECTTOMY WITH TISSUE REMOVAL FROM MAXILLARY SINUS. general    <BSHSIANPOST>    INITIAL Post-op Vital signs:   Vitals Value Taken Time   /88 8/31/2020  9:15 AM   Temp 36.4 °C (97.5 °F) 8/31/2020  8:49 AM   Pulse 58 8/31/2020  9:27 AM   Resp 20 8/31/2020  9:27 AM   SpO2 93 % 8/31/2020  9:27 AM   Vitals shown include unvalidated device data.

## 2020-08-31 NOTE — BRIEF OP NOTE
Brief Postoperative Note    Patient: Piper Nye  YOB: 1960  MRN: [id number]    Date of Procedure: 8/31/2020     Pre-Op Diagnosis: CHRONIC SINUSITIS    Post-Op Diagnosis: Same as preoperative diagnosis.       Procedure(s):  ENDOSCOPIC SINUS SURGERY OF FRONTAL SINUS, TOTAL ETHMOIDECTTOMY WITH TISSUE REMOVAL FROM MAXILLARY SINUS    Surgeon(s):  Griselda Marts, MD    Surgical Assistant: None    Anesthesia: General     Estimated Blood Loss (mL): less than 50     Complications: None    Specimens:   ID Type Source Tests Collected by Time Destination   1 : Left Nostril Middle Meatus Fresh Nose  Griselda Marts, MD 8/31/2020 0802 Pathology   2 : Right Nostril Middle Meatus Fresh Nose  Griselda Marts, MD 8/31/2020 0803 Pathology        Implants: * No implants in log *    Drains: * No LDAs found *    Findings: polyposis, bx    Electronically Signed by Carrol Hendricks MD on 8/31/2020 at 8:29 AM

## 2020-08-31 NOTE — ANESTHESIA PREPROCEDURE EVALUATION
Relevant Problems   No relevant active problems       Anesthetic History   No history of anesthetic complications            Review of Systems / Medical History  Patient summary reviewed, nursing notes reviewed and pertinent labs reviewed    Pulmonary        Sleep apnea           Neuro/Psych         Psychiatric history     Cardiovascular              CAD    Exercise tolerance: >4 METS     GI/Hepatic/Renal     GERD: well controlled           Endo/Other  Within defined limits           Other Findings              Physical Exam    Airway  Mallampati: II  TM Distance: > 6 cm  Neck ROM: normal range of motion   Mouth opening: Normal     Cardiovascular  Regular rate and rhythm,  S1 and S2 normal,  no murmur, click, rub, or gallop             Dental  No notable dental hx       Pulmonary  Breath sounds clear to auscultation               Abdominal  GI exam deferred       Other Findings            Anesthetic Plan    ASA: 3  Anesthesia type: general          Induction: Intravenous  Anesthetic plan and risks discussed with: Patient

## 2020-08-31 NOTE — H&P
History and Physical    Subjective:     Paige Rosa is a 56 y.o.  male who presents with chronic recurrent sinus dz and nasal polyposis recalcitrant to medical and allergy management. Past Medical History:   Diagnosis Date    Arthritis     CAD (coronary artery disease)     Candidal esophagitis ([address] 2008    Chronic rhinitis     Foot drop, left     GERD (gastroesophageal reflux disease)     Headache(784.0)     Hypercholesterolemia     Nonbacterial arachnoiditis     Psychiatric disorder     ANXIETY ,DEPRESSION    PTSD (post-traumatic stress disorder)     RLS (restless legs syndrome)     Sleep apnea     CPAP      Past Surgical History:   Procedure Laterality Date    EGD  2008    HX COLONOSCOPY      HX HEENT  2006, 2007    nasal surgery x 2    HX ORTHOPAEDIC      LUMBAR SURGERY X 2    HX ORTHOPAEDIC Left     THUMB    HX ORTHOPAEDIC Right     THUMB TENDON REPAIR    NEUROLOGICAL PROCEDURE UNLISTED  2013    SPINE STIMULATOR     Family History   Problem Relation Age of Onset    No Known Problems Mother     Heart Disease Father         MI    Other Father         ANEURYSM     Suicide Sister     Kidney Disease Brother         2 TRANSPLANTS    Alcohol abuse Neg Hx       Social History     Tobacco Use    Smoking status: Current Every Day Smoker    Smokeless tobacco: Never Used    Tobacco comment: SMOKES 3-7 CIGARETTES DAILY   Substance Use Topics    Alcohol use: Not Currently       Prior to Admission medications    Medication Sig Start Date End Date Taking? Authorizing Provider   montelukast (SINGULAIR) 10 mg tablet Take 10 mg by mouth every evening. Yes Provider, Historical   cyanocobalamin (Vitamin B-12) 100 mcg tablet Take 100 mcg by mouth daily. Yes Provider, Historical   cholecalciferol (Vitamin D3) 25 mcg (1,000 unit) cap Take  by mouth daily. Yes Provider, Historical   lidocaine (LIDODERM) 5 % by TransDERmal route every twenty-four (24) hours.  Apply patch to the affected area for 12 hours a day and remove for 12 hours a day. Yes Provider, Historical   atorvastatin (LIPITOR) 80 mg tablet Take 80 mg by mouth daily. Yes Provider, Historical   gabapentin (NEURONTIN) 300 mg capsule Take 100 mg by mouth three (3) times daily. Yes Provider, Historical   dextroamphetamine-amphetamine (ADDERALL) 20 mg tablet Take 20 mg by mouth. Yes Provider, Historical   PARoxetine (PAXIL) 40 mg tablet Take 40 mg by mouth daily. Yes Provider, Historical   omeprazole (PRILOSEC) 40 mg capsule Take 40 mg by mouth daily. Yes Provider, Historical   cetirizine (ZYRTEC) 10 mg tablet Take  by mouth daily. Yes Provider, Historical   pramipexole (MIRAPEX) 0.5 mg tablet Take 0.5 mg by mouth nightly. Yes Provider, Historical   trazodone (DESYREL) 50 mg tablet Take 150 mg by mouth. Yes Provider, Historical   PV W-O NEW/FERROUS FUMARATE/FA (M-VIT PO) Take  by mouth. Yes Provider, Historical     Allergies   Allergen Reactions    Aleve [Naproxen Sodium] Rash    Clindamycin Itching    Doxycycline Itching        Review of Systems:  A comprehensive review of systems was negative except for that written in the History of Present Illness. Objective: Intake and Output:    No intake/output data recorded. No intake/output data recorded. Physical Exam:   Visit Vitals  /74 (BP 1 Location: Right arm, BP Patient Position: At rest)   Pulse 61   Temp 98.1 °F (36.7 °C)   Resp 17   Ht 5' 10\" (1.778 m)   Wt 89.8 kg (198 lb)   SpO2 95%   BMI 28.41 kg/m²     General:  Alert, cooperative, no distress, appears stated age. Head:  Normocephalic, without obvious abnormality, atraumatic. Eyes:  Conjunctivae/corneas clear. PERRL, EOMs intact. Fundi benign   Ears:  Normal TMs and external ear canals both ears.    Nose: Nasal polyps obstructing middle meata bilat   Throat: Lips, mucosa, and tongue normal. Teeth and gums normal.   Neck: Supple, symmetrical, trachea midline, no adenopathy, thyroid: no enlargement/tenderness/nodules, no carotid bruit and no JVD. Back:   Symmetric, no curvature. ROM normal. No CVA tenderness. Lungs:   Clear to auscultation bilaterally. Chest wall:  No tenderness or deformity. Heart:  Regular rate and rhythm, S1, S2 normal, no murmur, click, rub or gallop. Extremities: Extremities normal, atraumatic, no cyanosis or edema. Pulses: 2+ and symmetric all extremities. Skin: Skin color, texture, turgor normal. No rashes or lesions   Lymph nodes: Cervical, supraclavicular, and axillary nodes normal.   Neurologic: CNII-XII intact. Normal strength, sensation and reflexes throughout. Data Review:   No results found for this or any previous visit (from the past 24 hour(s)). Assessment:     Nasal polyposis and Chronic recurrent sinusitis    Plan:     FESS frontal , ethmoid and maxillary sinuses.      Signed By: Hadley Oakley MD     August 31, 2020

## 2021-02-25 ENCOUNTER — VIRTUAL VISIT (OUTPATIENT)
Dept: NEUROLOGY | Age: 61
End: 2021-02-25
Payer: MEDICARE

## 2021-02-25 DIAGNOSIS — F43.10 PTSD (POST-TRAUMATIC STRESS DISORDER): ICD-10-CM

## 2021-02-25 DIAGNOSIS — E78.49 OTHER HYPERLIPIDEMIA: ICD-10-CM

## 2021-02-25 DIAGNOSIS — G25.81 RLS (RESTLESS LEGS SYNDROME): ICD-10-CM

## 2021-02-25 DIAGNOSIS — F90.0 ATTENTION DEFICIT HYPERACTIVITY DISORDER (ADHD), PREDOMINANTLY INATTENTIVE TYPE: Primary | ICD-10-CM

## 2021-02-25 PROCEDURE — 99214 OFFICE O/P EST MOD 30 MIN: CPT | Performed by: PSYCHIATRY & NEUROLOGY

## 2021-02-25 PROCEDURE — G9717 DOC PT DX DEP/BP F/U NT REQ: HCPCS | Performed by: PSYCHIATRY & NEUROLOGY

## 2021-02-25 PROCEDURE — 3017F COLORECTAL CA SCREEN DOC REV: CPT | Performed by: PSYCHIATRY & NEUROLOGY

## 2021-02-25 PROCEDURE — G8427 DOCREV CUR MEDS BY ELIG CLIN: HCPCS | Performed by: PSYCHIATRY & NEUROLOGY

## 2021-02-25 PROCEDURE — G8419 CALC BMI OUT NRM PARAM NOF/U: HCPCS | Performed by: PSYCHIATRY & NEUROLOGY

## 2021-02-25 NOTE — PROGRESS NOTES
Follow-up Visit    Name Kenneth Pierre Age 59 y.o. MRN [id number]  1960       Chief Complaint: Memory loss    Returns for follow-up visit. He continues to complain about his memory. He resists making lists though he acknowledges that they improve his thought process. He continues to follow-up with psychiatry over at the [facility]. He is compliant with his medications. He struggles with severe depression and posttraumatic stress. He finds it the pramipexole remains helpful for his restless leg syndrome. Assesment and Plan  1. Memory loss  As discussed above    2. Attention deficit hyperactivity disorder (ADHD), predominantly inattentive type  Reinforced importance of list making and staying on task. 3. PTSD (post-traumatic stress disorder)  Continue Paxil 40 mg daily    4. RLS (restless legs syndrome)  Continue pramipexole    5. Other hyperlipidemia  Continue atorvastatin      Allergies  Aleve [naproxen sodium], Clindamycin, and Doxycycline     Medications  Current Outpatient Medications   Medication Sig    montelukast (SINGULAIR) 10 mg tablet Take 10 mg by mouth every evening.  cyanocobalamin (Vitamin B-12) 100 mcg tablet Take 100 mcg by mouth daily.  cholecalciferol (Vitamin D3) 25 mcg (1,000 unit) cap Take  by mouth daily.  lidocaine (LIDODERM) 5 % by TransDERmal route every twenty-four (24) hours. Apply patch to the affected area for 12 hours a day and remove for 12 hours a day.  atorvastatin (LIPITOR) 80 mg tablet Take 80 mg by mouth daily.  gabapentin (NEURONTIN) 300 mg capsule Take 100 mg by mouth three (3) times daily.  dextroamphetamine-amphetamine (ADDERALL) 20 mg tablet Take 20 mg by mouth.  PARoxetine (PAXIL) 40 mg tablet Take 40 mg by mouth daily.  omeprazole (PRILOSEC) 40 mg capsule Take 40 mg by mouth daily.  cetirizine (ZYRTEC) 10 mg tablet Take  by mouth daily.  pramipexole (MIRAPEX) 0.5 mg tablet Take 0.5 mg by mouth nightly.     trazodone (DESYREL) 50 mg tablet Take 150 mg by mouth.  PV W-O NEW/FERROUS FUMARATE/FA (M-VIT PO) Take  by mouth. No current facility-administered medications for this visit. Medical History  Past Medical History:   Diagnosis Date    Arthritis     CAD (coronary artery disease)     [address] es[address] ([facility] [address] 2008    Chronic rhinitis     Foot drop, left     GERD (gastroesophageal reflux disease)     Headache(784.0)     Hypercholesterolemia     Nonbacterial arachnoiditis     Psychiatric disorder     ANXIETY ,DEPRESSION    PTSD (post-traumatic stress disorder)     RLS (restless legs syndrome)     Sleep apnea     CPAP       Review of Systems   Eyes: Negative for blurred vision and double vision. Respiratory: Negative for cough and shortness of breath. Cardiovascular: Negative for chest pain, palpitations and orthopnea. Gastrointestinal: Negative for nausea and vomiting. Neurological: Negative for dizziness and headaches. Psychiatric/Behavioral: Positive for memory loss. Negative for depression. The patient is nervous/anxious. Exam:      General: Well developed, well nourished. Patient in no apparent distress   Head: Normocephalic, atraumatic, anicteric sclera   Neck Normal ROM, No thyromegally   Lungs:   Normal effort   Ext: No pedal edema   Skin: Supple no rash     NeurologicExam:  Mental Status: Alert and oriented to person place and time  Self-deprecating and depressed   Speech: Fluent no aphasia or dysarthria. Cranial Nerves:  II - XII Intact   Motor:  Full and symmetric strength of upper and lower extremities. normal bulk. Sensory:   Symmetric and intact . Gait:  Gait is balanced and fluid with normal arm swing. Tremor:   No tremor noted. Cerebellar:  Coordination intact. Neurovascular: No carotid bruits. No JVD                           Chase Townsend was seen by synchronous (real-time) audio-video technology on 02/25/21.      Consent:  He and/or his healthcare decision maker is aware that this patient-initiated Telehealth encounter is a billable service, with coverage as determined by his insurance carrier. He is aware that he may receive a bill and has provided verbal consent to proceed: Yes    I was in the office while conducting this encounter. Pursuant to the emergency declaration under the [address] [place] [address], [facility] waiver authority and the [employer] Act, this Virtual  Visit was conducted, with patient's consent, to reduce the patient's risk of exposure to COVID-19 and provide continuity of care for an established patient. Services were provided through a video synchronous discussion virtually to substitute for in-person clinic visit.

## 2021-04-01 ENCOUNTER — TELEPHONE (OUTPATIENT)
Dept: NEUROLOGY | Age: 61
End: 2021-04-01

## 2021-04-09 ENCOUNTER — TELEPHONE (OUTPATIENT)
Dept: NEUROLOGY | Age: 61
End: 2021-04-09

## 2021-06-09 ENCOUNTER — OFFICE VISIT (OUTPATIENT)
Dept: NEUROLOGY | Age: 61
End: 2021-06-09
Payer: MEDICARE

## 2021-06-09 VITALS
HEIGHT: 70 IN | BODY MASS INDEX: 28.63 KG/M2 | HEART RATE: 67 BPM | OXYGEN SATURATION: 96 % | DIASTOLIC BLOOD PRESSURE: 80 MMHG | WEIGHT: 200 LBS | SYSTOLIC BLOOD PRESSURE: 141 MMHG

## 2021-06-09 DIAGNOSIS — F90.1 ATTENTION DEFICIT HYPERACTIVITY DISORDER (ADHD), PREDOMINANTLY HYPERACTIVE TYPE: Primary | ICD-10-CM

## 2021-06-09 DIAGNOSIS — F43.10 PTSD (POST-TRAUMATIC STRESS DISORDER): ICD-10-CM

## 2021-06-09 DIAGNOSIS — G25.81 RLS (RESTLESS LEGS SYNDROME): ICD-10-CM

## 2021-06-09 DIAGNOSIS — R41.3 MEMORY LOSS: ICD-10-CM

## 2021-06-09 PROCEDURE — 99214 OFFICE O/P EST MOD 30 MIN: CPT | Performed by: PSYCHIATRY & NEUROLOGY

## 2021-06-09 PROCEDURE — G9717 DOC PT DX DEP/BP F/U NT REQ: HCPCS | Performed by: PSYCHIATRY & NEUROLOGY

## 2021-06-09 PROCEDURE — 3017F COLORECTAL CA SCREEN DOC REV: CPT | Performed by: PSYCHIATRY & NEUROLOGY

## 2021-06-09 PROCEDURE — G8419 CALC BMI OUT NRM PARAM NOF/U: HCPCS | Performed by: PSYCHIATRY & NEUROLOGY

## 2021-06-09 PROCEDURE — G8427 DOCREV CUR MEDS BY ELIG CLIN: HCPCS | Performed by: PSYCHIATRY & NEUROLOGY

## 2021-06-09 RX ORDER — DEXTROAMPHETAMINE SACCHARATE, AMPHETAMINE ASPARTATE MONOHYDRATE, DEXTROAMPHETAMINE SULFATE AND AMPHETAMINE SULFATE 2.5; 2.5; 2.5; 2.5 MG/1; MG/1; MG/1; MG/1
20 CAPSULE, EXTENDED RELEASE ORAL
COMMUNITY
Start: 2021-05-17

## 2021-06-09 RX ORDER — ASCORBIC ACID 500 MG
500 TABLET ORAL DAILY
COMMUNITY

## 2021-06-09 NOTE — PROGRESS NOTES
Follow-up Visit    Name Lazarus Rich Age 59 y.o. MRN [id number]  1960       Chief Complaint: Memory loss    Returns for a follow visit. He continues to struggle with staying on task. He continues to his medications. He is getting his medications through the [place]. Restless leg is under control      Assesment and Plan  1. Memory loss  As discussed above    2. Attention deficit hyperactivity disorder (ADHD), predominantly inattentive type  Reinforced importance of list making and staying on task. Recommend concerta as opposed to amphetamine. He will be talking to his psychiatrist.     3. PTSD (post-traumatic stress disorder)  Continue Paxil 40 mg daily    4. RLS (restless legs syndrome)  Continue pramipexole    5. Other hyperlipidemia  Continue atorvastatin      Allergies  Aleve [naproxen sodium], Clindamycin, and Doxycycline     Medications  Current Outpatient Medications   Medication Sig    amphetamine-dextroamphetamine XR (ADDERALL XR) 10 mg XR capsule 20mg in the morning and 10mg in the afternoon    ascorbic acid, vitamin C, (Vitamin C) 500 mg tablet Take 500 mg by mouth daily.  ZINC PO Take 1 Tablet by mouth daily.  montelukast (SINGULAIR) 10 mg tablet Take 10 mg by mouth every evening.  cyanocobalamin (Vitamin B-12) 100 mcg tablet Take 100 mcg by mouth daily.  cholecalciferol (Vitamin D3) 25 mcg (1,000 unit) cap Take  by mouth daily.  lidocaine (LIDODERM) 5 % by TransDERmal route every twenty-four (24) hours. Apply patch to the affected area for 12 hours a day and remove for 12 hours a day.  atorvastatin (LIPITOR) 80 mg tablet Take 80 mg by mouth daily.  gabapentin (NEURONTIN) 300 mg capsule Take 600 mg by mouth two (2) times a day.  PARoxetine (PAXIL) 40 mg tablet Take 40 mg by mouth daily.  omeprazole (PRILOSEC) 40 mg capsule Take 40 mg by mouth daily.  cetirizine (ZYRTEC) 10 mg tablet Take  by mouth daily.     pramipexole (MIRAPEX) 0.5 mg tablet Take 0.5 mg by mouth nightly.  trazodone (DESYREL) 50 mg tablet Take 150 mg by mouth.  PV W-O NEW/FERROUS FUMARATE/FA (M-VIT PO) Take  by mouth. No current facility-administered medications for this visit. Medical History  Past Medical History:   Diagnosis Date    Arthritis     CAD (coronary artery disease)     [address] ([facility] [address] 2008    Chronic rhinitis     Foot drop, left     GERD (gastroesophageal reflux disease)     Headache(784.0)     Hypercholesterolemia     Nonbacterial arachnoiditis     Psychiatric disorder     ANXIETY ,DEPRESSION    PTSD (post-traumatic stress disorder)     RLS (restless legs syndrome)     Sleep apnea     CPAP       Review of Systems   Eyes: Negative for blurred vision and double vision. Respiratory: Negative for cough and shortness of breath. Cardiovascular: Negative for chest pain, palpitations and orthopnea. Gastrointestinal: Negative for nausea and vomiting. Neurological: Negative for dizziness and headaches. Psychiatric/Behavioral: Positive for memory loss. Negative for depression. The patient is nervous/anxious. Exam:  Visit Vitals  BP (!) 141/80   Pulse 67   Ht 5' 10\" (1.778 m)   Wt 200 lb (90.7 kg)   SpO2 96%   BMI 28.70 kg/m²       General: Well developed, well nourished. Patient in no apparent distress   Head: Normocephalic, atraumatic, anicteric sclera   Neck Normal ROM, No thyromegally   Lungs:   Normal effort   Ext: No pedal edema   Skin: Supple no rash     NeurologicExam:  Mental Status: Alert and oriented to person place and time  Self-deprecating and depressed   Speech: Fluent no aphasia or dysarthria. Cranial Nerves:  II - XII Intact   Motor:  Full and symmetric strength of upper and lower extremities. normal bulk. Sensory:   Symmetric and intact . Gait:  Gait is balanced and fluid with normal arm swing. Tremor:   No tremor noted. Cerebellar:  Coordination intact. Neurovascular: No carotid bruits.  No JVD

## 2021-12-03 ENCOUNTER — OFFICE VISIT (OUTPATIENT)
Dept: NEUROLOGY | Age: 61
End: 2021-12-03
Payer: MEDICARE

## 2021-12-03 VITALS — RESPIRATION RATE: 16 BRPM

## 2021-12-03 DIAGNOSIS — R41.3 MEMORY LOSS: Primary | ICD-10-CM

## 2021-12-03 DIAGNOSIS — F90.1 ATTENTION DEFICIT HYPERACTIVITY DISORDER (ADHD), PREDOMINANTLY HYPERACTIVE TYPE: ICD-10-CM

## 2021-12-03 DIAGNOSIS — F43.10 PTSD (POST-TRAUMATIC STRESS DISORDER): ICD-10-CM

## 2021-12-03 PROCEDURE — 3017F COLORECTAL CA SCREEN DOC REV: CPT | Performed by: PSYCHIATRY & NEUROLOGY

## 2021-12-03 PROCEDURE — 99214 OFFICE O/P EST MOD 30 MIN: CPT | Performed by: PSYCHIATRY & NEUROLOGY

## 2021-12-03 PROCEDURE — G8427 DOCREV CUR MEDS BY ELIG CLIN: HCPCS | Performed by: PSYCHIATRY & NEUROLOGY

## 2021-12-03 PROCEDURE — G8419 CALC BMI OUT NRM PARAM NOF/U: HCPCS | Performed by: PSYCHIATRY & NEUROLOGY

## 2021-12-03 PROCEDURE — G9717 DOC PT DX DEP/BP F/U NT REQ: HCPCS | Performed by: PSYCHIATRY & NEUROLOGY

## 2021-12-03 RX ORDER — ESCITALOPRAM OXALATE 5 MG/1
TABLET ORAL DAILY
COMMUNITY
End: 2022-07-11

## 2021-12-03 NOTE — PROGRESS NOTES
Follow-up Visit    Name Kaur Bahena Age 59 y.o. MRN [id number]  1960       Chief Complaint: Memory loss    Paxil switched to lexapro. Could not tolerate increasing his aderral.  Otherwise memory has been fairly stable. His psychiatrist at the [place] wanted his notes. His wife has retired and is staying home with him. Continues to struggle with depression. His wife complains of his anhedonia. She feels that he is happy around people but does not feel motivated to be around him. He himself has no thoughts of harming himself or others and feels content. Assesment and Plan  1. Memory loss  I do not feel he has dementia but he does have memory that is affected by severe depression and attention deficit disorder. He was seen by Dr. Davila Rear 2015 and memory loss was not supported     2. Attention deficit hyperactivity disorder (ADHD), predominantly inattentive type  Reinforced importance of list making and staying on task. Recommend concerta as opposed to amphetamine. He will be talking to his psychiatrist.     3. PTSD (post-traumatic stress disorder)  Continue Paxil 40 mg daily    4. RLS (restless legs syndrome)  Continue pramipexole    5. Other hyperlipidemia  Continue atorvastatin      Allergies  Coconut oil, Aleve [naproxen sodium], Clindamycin, and Doxycycline     Medications  Current Outpatient Medications   Medication Sig    escitalopram oxalate (Lexapro) 5 mg tablet Take  by mouth daily.  amphetamine-dextroamphetamine XR (ADDERALL XR) 10 mg XR capsule 20mg in the morning and 10mg in the afternoon    ascorbic acid, vitamin C, (Vitamin C) 500 mg tablet Take 500 mg by mouth daily.  ZINC PO Take 1 Tablet by mouth daily.  cyanocobalamin (Vitamin B-12) 100 mcg tablet Take 100 mcg by mouth daily.  cholecalciferol (Vitamin D3) 25 mcg (1,000 unit) cap Take  by mouth daily.  lidocaine (LIDODERM) 5 % by TransDERmal route every twenty-four (24) hours.  Apply patch to the affected area for 12 hours a day and remove for 12 hours a day.  atorvastatin (LIPITOR) 80 mg tablet Take 80 mg by mouth daily.  gabapentin (NEURONTIN) 300 mg capsule Take 600 mg by mouth two (2) times a day.  PARoxetine (PAXIL) 40 mg tablet Take 40 mg by mouth daily.  omeprazole (PRILOSEC) 40 mg capsule Take 40 mg by mouth daily.  cetirizine (ZYRTEC) 10 mg tablet Take  by mouth daily.  pramipexole (MIRAPEX) 0.5 mg tablet Take 0.5 mg by mouth nightly.  trazodone (DESYREL) 50 mg tablet Take 150 mg by mouth.  PV W-O NEW/FERROUS FUMARATE/FA (M-VIT PO) Take  by mouth.  montelukast (SINGULAIR) 10 mg tablet Take 10 mg by mouth every evening. (Patient not taking: Reported on 12/3/2021)     No current facility-administered medications for this visit. Medical History  Past Medical History:   Diagnosis Date    Arthritis     CAD (coronary artery disease)     Candidal esophagitis ([facility] [address] 2008    Chronic rhinitis     Foot drop, left     GERD (gastroesophageal reflux disease)     Headache(784.0)     Hypercholesterolemia     Nonbacterial arachnoiditis     Psychiatric disorder     ANXIETY ,DEPRESSION    PTSD (post-traumatic stress disorder)     RLS (restless legs syndrome)     Sleep apnea     CPAP       Review of Systems   Eyes: Negative for blurred vision and double vision. Respiratory: Negative for cough and shortness of breath. Cardiovascular: Negative for chest pain, palpitations and orthopnea. Gastrointestinal: Negative for nausea and vomiting. Neurological: Negative for dizziness and headaches. Psychiatric/Behavioral: Positive for memory loss. Negative for depression. The patient is nervous/anxious. Exam:  Visit Vitals  Resp 16     General: Well developed, well nourished.  Patient in no apparent distress   Head: Normocephalic, atraumatic, anicteric sclera   Neck Normal ROM, No thyromegally   Cardiac: Regular rate and rhythm   Ext: No pedal edema   Skin: Supple no rash NeurologicExam:  Mental Status: Alert and oriented to person place and time   Speech: Fluent no aphasia or dysarthria. Cranial Nerves:  II - XII Intact   Motor:  Full and symmetric strength . Normal bulk and tone. Sensory:   Symmetric and intact    Gait:  Gait is balanced and fluid with normal arm swing. Tremor:   No tremor noted. Cerebellar:  Coordination intact.

## 2022-04-22 NOTE — PROGRESS NOTES
Neurology Note    Patient ID:  Fern Sandifer  [id number]  54 y.o.  1960      Date of Consultation:  April 25, 2022        Assessment and Plan:    The patient is a 58-year-old gentleman with multiple medical conditions who returns to the neurology clinic due to difficulties with his cognition. His neurological examination does reveal decreased attention, difficulties with short-term memory, and a chronic left foot drop. Mild cognitive impairment:  It appears he did have neuropsychological testing completed. The last one was in 2015 which reportedly did not reveal evidence of a dementia. Given that he is having worsening difficulties with recent memory, I will obtain a follow-up study. Differential includes early onset of a dementia versus a pseudodementia associated with his ADHD and severe depression. ADHD:  He is followed closely at the [facility] and is on regimen. He will continue to follow closely with them. Posttraumatic stress disorder/depression:  He continues on antidepressants through the [address] [place] [id number].    Left foot drop: This is associated with his prior lumbar spine disease status post surgery. Subjective: My memory       History of Present Illness:   Fern Sandifer is a 57 y.o. male who returns to the neurology clinic at [facility] for evaluation. This is my first encounter with the patient. He was followed by prior neurologist and last seen in the clinic on December 7, 2021. He was being seen for a memory loss, ADHD, posttraumatic stress disorder, restless leg syndrome and hyperlipidemia. Since that time, he does notice a complete significant mental status. He feels like he spaces out. His wife, who is with him today does note that sometimes he will have a conversation and he will look at her and have a blank look over his eyes. It appears he has had 4 different neuropsych tests in the past the last one was at least 4 to 5 years ago.   He has had one at [facility] as well as neuropsych testing at the [facility]. It appears there was some differing opinions on those but no evidence of a dementia on those reports. He still does have a counselor who he sees for many years. He does continue to have his ADHD. He continues to take Adderall. There was discussion about him switching to Concerta but this has not occurred at the [facility] hospital as of yet. He also does continue to have his take. He did have a bout of worsening headaches since he was here last.  He has had intermittent headaches for years but 3 months ago he was getting them daily. He went and had a head CT performed which did reveal sinusitis. He was placed on Augmentin. His headaches worsened while he was on Augmentin and this was thus stopped. He was also started on Medrol PulsePak. The headaches have now improved significantly. He is getting physical therapy and dry needling.     He does have his chronic low back pain he did have a spinal cord stimulator placed in the past.    Past Medical History:   Diagnosis Date    Arthritis     CAD (coronary artery disease)     [facility] ([facility] 2008    Chronic rhinitis     Foot drop, left     GERD (gastroesophageal reflux disease)     Headache(784.0)     Hypercholesterolemia     Nonbacterial arachnoiditis     Psychiatric disorder     ANXIETY ,DEPRESSION    PTSD (post-traumatic stress disorder)     RLS (restless legs syndrome)     Sleep apnea     CPAP        Past Surgical History:   Procedure Laterality Date    EGD  2008    HX COLONOSCOPY      HX HEENT  2006, 2007    nasal surgery x 2    HX ORTHOPAEDIC      LUMBAR SURGERY X 2    HX ORTHOPAEDIC Left     THUMB    HX ORTHOPAEDIC Right     THUMB TENDON REPAIR    NEUROLOGICAL PROCEDURE UNLISTED  2013    SPINE STIMULATOR        Family History   Problem Relation Age of Onset    No Known Problems Mother     Heart Disease Father         MI    Other Father         ANEURYSM     Suicide Sister     Kidney Disease Brother         2 TRANSPLANTS    Alcohol abuse Neg Hx         Social History     Tobacco Use    Smoking status: Current Every Day Smoker    Smokeless tobacco: Never Used    Tobacco comment: SMOKES 3-7 CIGARETTES DAILY   Substance Use Topics    Alcohol use: Not Currently        Allergies   Allergen Reactions    Augmentin [Amoxicillin-Pot Clavulanate] Other (comments)     headaches    Coconut Oil Other (comments)    Aleve [Naproxen Sodium] Rash    Clindamycin Itching    Doxycycline Itching        Prior to Admission medications    Medication Sig Start Date End Date Taking? Authorizing Provider   escitalopram oxalate (Lexapro) 5 mg tablet Take  by mouth daily. Yes Provider, Historical   amphetamine-dextroamphetamine XR (ADDERALL XR) 10 mg XR capsule 20mg in the morning and 10mg in the afternoon 5/17/21  Yes Provider, Historical   ascorbic acid, vitamin C, (Vitamin C) 500 mg tablet Take 500 mg by mouth daily. Yes Provider, Historical   ZINC PO Take 1 Tablet by mouth daily. Yes Provider, Historical   cyanocobalamin (Vitamin B-12) 100 mcg tablet Take 100 mcg by mouth daily. Yes Provider, Historical   cholecalciferol (Vitamin D3) 25 mcg (1,000 unit) cap Take  by mouth daily. Yes Provider, Historical   lidocaine (LIDODERM) 5 % by TransDERmal route every twenty-four (24) hours. Apply patch to the affected area for 12 hours a day and remove for 12 hours a day. Yes Provider, Historical   atorvastatin (LIPITOR) 80 mg tablet Take 80 mg by mouth daily. Yes Provider, Historical   gabapentin (NEURONTIN) 300 mg capsule Take 600 mg by mouth two (2) times a day. Yes Provider, Historical   omeprazole (PRILOSEC) 40 mg capsule Take 40 mg by mouth daily. Yes Provider, Historical   cetirizine (ZYRTEC) 10 mg tablet Take  by mouth daily. Yes Provider, Historical   pramipexole (MIRAPEX) 0.5 mg tablet Take 0.5 mg by mouth nightly.    Yes Provider, Historical   trazodone (DESYREL) 50 mg tablet Take 150 mg by mouth. Yes Provider, Historical   PV W-O NEW/FERROUS FUMARATE/FA (M-VIT PO) Take  by mouth. Yes Provider, Historical   PARoxetine (PAXIL) 40 mg tablet Take 40 mg by mouth daily. Patient not taking: Reported on 4/25/2022    Provider, Historical       Review of Systems:    General, constitutional: negative  Eyes, vision: negative  Ears, nose, throat: negative  Cardiovascular, heart: negative  Respiratory: negative  Gastrointestinal: negative  Genitourinary: negative  Musculoskeletal: negative  Skin and integumentary: negative  Psychiatric: negative  Endocrine: negative  Neurological: negative, except for HPI  Hematologic/lymphatic: negative  Allergy/immunology: negative    Objective:     Visit Vitals  /67   Pulse 68   Temp 98 °F (36.7 °C) (Temporal)   Ht 5' 10\" (1.778 m)   Wt 193 lb 6.4 oz (87.7 kg)   SpO2 97%   BMI 27.75 kg/m²       Physical Exam:      General:  appears well nourished in no acute distress  Neck: no carotid bruits  Lungs: clear to auscultation  Heart:  no murmurs, regular rate  Lower extremity: peripheral pulses palpable and no edema  Skin: intact    Neurological exam:    Awake, alert, oriented to person, place and time  Recent and remote memory were normal  Decreased attention and concentration. He was able to follow commands naming objects was correct. Language was intact. There was no aphasia  Speech: no dysarthria  Fund of knowledge was preserved    Cranial nerves:   II-XII were tested    Perrrla  Visual fields were full  Eomi, no evidence of nystagmus  Facial sensation:  normal and symmetric  Facial motor: normal and symmetric. He does have intermittent and irregular facial tics. Hearing intact  SCM strength intact  Tongue: midline without fasciculations    Motor: Tone normal  There was no pronator drift  No evidence of fasciculations    Strength testing:   deltoid triceps biceps Wrist ext. Wrist flex. intrinsics Hip flex. Hip ext. Knee ext.   Knee flex Dorsi flex Plantar flex   Right 5 5 5 5 5 5 5 5 5 5 5 5   Left 5 5 5 5 5 5 5 5 5 5 4 5         Sensory:  Upper extremity: intact to pp, light touch, and vibration > 10 seconds  Lower extremity: intact to pp. vibration was 3 seconds at his toes and 6 seconds at his ankle    Reflexes:    Right Left  Biceps  2 2  Triceps 2 2  Brachiorad. 2 2  Patella  2 2  Achilles 1 1    Plantar response:  flexor bilaterally      Cerebellar testing:  no tremor apparent, finger/nose and julia were intact    Romberg: Present    Gait: steady. Mild left foot drop    Labs:     Lab Results   Component Value Date/Time    WBC 7.6 08/21/2020 11:06 AM    HCT 46.9 08/21/2020 11:06 AM    HGB 15.3 08/21/2020 11:06 AM    PLATELET 138 98/43/5314 11:06 AM       Imaging:    No results found for this or any previous visit. No results found for this or any previous visit. 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 A    Anxiety F41.9    Borderline personality traits F60.3        The patient should return to clinic in 6 months    Renewed medication; none. All through the [place]    I spent 30   minutes on the day of the encounter preparing the office visit by reviewing medical records, obtaining a history, performing examination, counseling and educating the patient and family members on diagnosis, ordering  tests, documenting in the clinical medical record, and coordinating the care for the patient. The patient had the ability to ask questions and all questions were answered.                Signed By:  Tolu Davila DO FAAN    April 25, 2022

## 2022-04-25 ENCOUNTER — OFFICE VISIT (OUTPATIENT)
Dept: NEUROLOGY | Age: 62
End: 2022-04-25
Payer: MEDICARE

## 2022-04-25 VITALS
HEART RATE: 68 BPM | WEIGHT: 193.4 LBS | OXYGEN SATURATION: 97 % | BODY MASS INDEX: 27.69 KG/M2 | HEIGHT: 70 IN | DIASTOLIC BLOOD PRESSURE: 67 MMHG | SYSTOLIC BLOOD PRESSURE: 139 MMHG | TEMPERATURE: 98 F

## 2022-04-25 DIAGNOSIS — R41.3 MEMORY LOSS: Primary | ICD-10-CM

## 2022-04-25 DIAGNOSIS — M21.372 LEFT FOOT DROP: ICD-10-CM

## 2022-04-25 DIAGNOSIS — F90.1 ATTENTION DEFICIT HYPERACTIVITY DISORDER (ADHD), PREDOMINANTLY HYPERACTIVE TYPE: ICD-10-CM

## 2022-04-25 DIAGNOSIS — F43.10 PTSD (POST-TRAUMATIC STRESS DISORDER): ICD-10-CM

## 2022-04-25 PROCEDURE — G9717 DOC PT DX DEP/BP F/U NT REQ: HCPCS | Performed by: PSYCHIATRY & NEUROLOGY

## 2022-04-25 PROCEDURE — G8419 CALC BMI OUT NRM PARAM NOF/U: HCPCS | Performed by: PSYCHIATRY & NEUROLOGY

## 2022-04-25 PROCEDURE — G8427 DOCREV CUR MEDS BY ELIG CLIN: HCPCS | Performed by: PSYCHIATRY & NEUROLOGY

## 2022-04-25 PROCEDURE — 99214 OFFICE O/P EST MOD 30 MIN: CPT | Performed by: PSYCHIATRY & NEUROLOGY

## 2022-04-25 PROCEDURE — 3017F COLORECTAL CA SCREEN DOC REV: CPT | Performed by: PSYCHIATRY & NEUROLOGY

## 2022-04-25 NOTE — LETTER
4/25/2022    Patient: Sugey Desai   YOB: 1960   Date of Visit: 4/25/2022     Pauline Joy, [address] [place] [address]  Via Fax: [phone]    Dear Pauline Joy MD,      Thank you for referring Mr. Abbe Rivera to [address] [place] [address] for evaluation. My notes for this consultation are attached. If you have questions, please do not hesitate to call me. I look forward to following your patient along with you.       Sincerely,    Royce Kirk, DO

## 2022-07-11 ENCOUNTER — OFFICE VISIT (OUTPATIENT)
Dept: NEUROLOGY | Age: 62
End: 2022-07-11
Payer: MEDICARE

## 2022-07-11 VITALS
HEIGHT: 70 IN | HEART RATE: 68 BPM | RESPIRATION RATE: 16 BRPM | SYSTOLIC BLOOD PRESSURE: 110 MMHG | OXYGEN SATURATION: 96 % | DIASTOLIC BLOOD PRESSURE: 70 MMHG | BODY MASS INDEX: 26.86 KG/M2 | WEIGHT: 187.6 LBS | TEMPERATURE: 97.6 F

## 2022-07-11 DIAGNOSIS — M47.812 SPONDYLOSIS OF CERVICAL REGION WITHOUT MYELOPATHY OR RADICULOPATHY: ICD-10-CM

## 2022-07-11 DIAGNOSIS — R41.3 MEMORY LOSS: ICD-10-CM

## 2022-07-11 DIAGNOSIS — G44.86 CERVICOGENIC HEADACHE: Primary | ICD-10-CM

## 2022-07-11 DIAGNOSIS — F90.1 ATTENTION DEFICIT HYPERACTIVITY DISORDER (ADHD), PREDOMINANTLY HYPERACTIVE TYPE: ICD-10-CM

## 2022-07-11 DIAGNOSIS — M62.838 SPASM OF MUSCLE: ICD-10-CM

## 2022-07-11 PROCEDURE — 99214 OFFICE O/P EST MOD 30 MIN: CPT | Performed by: NURSE PRACTITIONER

## 2022-07-11 RX ORDER — CYCLOBENZAPRINE HCL 5 MG
5 TABLET ORAL 2 TIMES DAILY
Qty: 60 TABLET | Refills: 2 | Status: SHIPPED | OUTPATIENT
Start: 2022-07-11 | End: 2022-10-17 | Stop reason: ALTCHOICE

## 2022-07-11 RX ORDER — FOLIC ACID 1 MG/1
1 TABLET ORAL DAILY
COMMUNITY

## 2022-07-11 RX ORDER — LANOLIN ALCOHOL/MO/W.PET/CERES
100 CREAM (GRAM) TOPICAL DAILY
COMMUNITY

## 2022-07-11 RX ORDER — EPINEPHRINE 0.3 MG/.3ML
INJECTION SUBCUTANEOUS
COMMUNITY
Start: 2022-05-26

## 2022-07-11 RX ORDER — ESCITALOPRAM OXALATE 10 MG/1
10 TABLET ORAL DAILY
COMMUNITY
Start: 2022-04-08

## 2022-07-11 NOTE — PROGRESS NOTES
Chief Complaint   Patient presents with    Follow-up    Memory Loss    Headache     1. Have you been to the ER, urgent care clinic since your last visit? No  Hospitalized since your last visit? NO  2. Have you seen or consulted any other health care providers outside of the [address] [place] [address] since your last visit? YES [facility] Include any pap smears or colon screening.   NO    Patient has noted increase with memory loss and headaches the dry needling helped request another referral.
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
## 2022-07-19 ENCOUNTER — OFFICE VISIT (OUTPATIENT)
Dept: NEUROLOGY | Age: 62
End: 2022-07-19
Payer: MEDICARE

## 2022-07-19 DIAGNOSIS — F33.0 MILD EPISODE OF RECURRENT MAJOR DEPRESSIVE DISORDER (HCC): Primary | ICD-10-CM

## 2022-07-19 DIAGNOSIS — F41.1 GENERALIZED ANXIETY DISORDER: ICD-10-CM

## 2022-07-19 DIAGNOSIS — F43.10 POSTTRAUMATIC STRESS DISORDER: ICD-10-CM

## 2022-07-19 PROCEDURE — 90791 PSYCH DIAGNOSTIC EVALUATION: CPT | Performed by: CLINICAL NEUROPSYCHOLOGIST
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
## 2022-07-27 ENCOUNTER — OFFICE VISIT (OUTPATIENT)
Dept: NEUROLOGY | Age: 62
End: 2022-07-27
Payer: MEDICARE

## 2022-07-27 DIAGNOSIS — F90.2 ATTENTION DEFICIT HYPERACTIVITY DISORDER (ADHD), COMBINED TYPE: ICD-10-CM

## 2022-07-27 DIAGNOSIS — F33.9 MAJOR DEPRESSIVE DISORDER, RECURRENT EPISODE WITH ANXIOUS DISTRESS (HCC): Primary | ICD-10-CM

## 2022-07-27 PROCEDURE — 96138 PSYCL/NRPSYC TECH 1ST: CPT | Performed by: CLINICAL NEUROPSYCHOLOGIST

## 2022-07-27 PROCEDURE — 96139 PSYCL/NRPSYC TST TECH EA: CPT | Performed by: CLINICAL NEUROPSYCHOLOGIST
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
## 2022-08-10 ENCOUNTER — HOSPITAL ENCOUNTER (OUTPATIENT)
Dept: CT IMAGING | Age: 62
Discharge: HOME | End: 2022-08-10
Attending: NURSE PRACTITIONER
Payer: MEDICARE

## 2022-08-10 DIAGNOSIS — G44.86 CERVICOGENIC HEADACHE: ICD-10-CM

## 2022-08-10 DIAGNOSIS — M47.812 SPONDYLOSIS OF CERVICAL REGION WITHOUT MYELOPATHY OR RADICULOPATHY: ICD-10-CM

## 2022-08-10 PROCEDURE — 72125 CT NECK SPINE W/O DYE: CPT

## 2022-08-12 NOTE — PROGRESS NOTES
Good morning,  You do have some arthritis in your neck that is causing some stenosis. Fortunately, no fractures. Hopefully the PT is helping.   EN

## 2022-08-24 ENCOUNTER — OFFICE VISIT (OUTPATIENT)
Dept: NEUROLOGY | Age: 62
End: 2022-08-24
Payer: MEDICARE

## 2022-08-24 DIAGNOSIS — F33.1 MODERATE EPISODE OF RECURRENT MAJOR DEPRESSIVE DISORDER (HCC): Primary | ICD-10-CM

## 2022-08-24 PROCEDURE — 96132 NRPSYC TST EVAL PHYS/QHP 1ST: CPT | Performed by: CLINICAL NEUROPSYCHOLOGIST

## 2022-08-24 PROCEDURE — 96133 NRPSYC TST EVAL PHYS/QHP EA: CPT | Performed by: CLINICAL NEUROPSYCHOLOGIST

## 2022-08-24 NOTE — PROGRESS NOTES
Prior to seeing the patient I reviewed pertinent records, including the previously completed report, the records in [place], and any updated visits from other providers since the patient's last visit. Today, I engaged in a psychoeducational and supportive feedback session with the patient. I provided psychotherapy in the form of psychoeducation and support with respect to the results of the recent Neuropsychological Evaluation, including discussing individual tests as well as patient's areas of neurocognitive strength versus weakness. We discussed, in detail, the following:  Tests were largely within normal limits with the exception of sustained attention and working memory  Compared to the previous evaluation, no significant changes were observed  Patient's reported decline is suspected to be due to other contributory factors such as mood  Patient and his wife expressed concerns regarding the presence of episodes where he \"goes blank\"  Discussed retesting in 9 months without medication (i.e., Adderall)    Education was provided regarding my diagnostic impressions, and we discussed treatment plan/options. I also answered numerous questions related to the clinical findings, including discussing various methods to improve cognition and mood. Supportive/Cognitive Behavioral/Solution Focused psychotherapy provided. The patient needs to:    Follow-up with Alejandra Montesinos NP in October  Focus on improving mood  Incorporate modifiable protective behaviors into weekly routine (e.g., exercise and healthy diet)    The patient had the following concerns which I deferred to their referring provider:   Prolonged EEG    TIME:  Clinical Interview: 1005 - 9072 = 37 minutes  Testing by technician: 5534 - 2993 = 138 minutes  Scoring by technician: 57 minutes  Neuropsychological testing evaluation services*: 170 minutes + 45 minutes feedback = 215 minutes total    BILLING:  65343 x 1 Unit  96138 x 1 Unit  96139 x 5 Units  96132 x 1 Units  96133 x 3 Units    *Neuropsychological testing evaluation services include: Integration of patient data, interpretation of standardized test results and clinical data, clinical decision-making, treatment planning and report, and interactive feedback to the patient, family member(s) or caregiver(s), when performed.

## 2022-10-17 ENCOUNTER — OFFICE VISIT (OUTPATIENT)
Dept: NEUROLOGY | Age: 62
End: 2022-10-17
Payer: MEDICARE

## 2022-10-17 VITALS
WEIGHT: 184 LBS | HEIGHT: 70 IN | HEART RATE: 66 BPM | OXYGEN SATURATION: 96 % | SYSTOLIC BLOOD PRESSURE: 120 MMHG | BODY MASS INDEX: 26.34 KG/M2 | TEMPERATURE: 97.8 F | RESPIRATION RATE: 16 BRPM | DIASTOLIC BLOOD PRESSURE: 70 MMHG

## 2022-10-17 DIAGNOSIS — M47.812 CERVICAL SPONDYLOSIS: ICD-10-CM

## 2022-10-17 DIAGNOSIS — R29.6 FALLS FREQUENTLY: ICD-10-CM

## 2022-10-17 DIAGNOSIS — F41.8 DEPRESSION WITH ANXIETY: ICD-10-CM

## 2022-10-17 DIAGNOSIS — R26.89 BALANCE DISORDER: Primary | ICD-10-CM

## 2022-10-17 DIAGNOSIS — F90.8 ATTENTION DEFICIT HYPERACTIVITY DISORDER (ADHD), OTHER TYPE: ICD-10-CM

## 2022-10-17 PROCEDURE — 99214 OFFICE O/P EST MOD 30 MIN: CPT | Performed by: NURSE PRACTITIONER

## 2022-10-17 RX ORDER — MONTELUKAST SODIUM 10 MG/1
10 TABLET ORAL AS NEEDED
COMMUNITY

## 2022-10-17 NOTE — PROGRESS NOTES
Chief Complaint   Patient presents with    Follow-up     Cervicogenic headache recent visit with Dr Jewel Teran review of CT cervical spine and physical therapy        1. Have you been to the ER, urgent care clinic since your last visit? No Hospitalized since your last visit? NO    2. Have you seen or consulted any other health care providers outside of the [address] [place] [address] since your last visit? Yes Dentist for oral surgery this has helped with the headaches. Include any pap smears or colon screening.  NO

## 2022-10-17 NOTE — PROGRESS NOTES
[facility]  [address] [place] [address]  Halima Julio  Tel: [phone]  Fax: [phone]      Date:  10/17/22     Name:  Sumeet Luna  :  1960  MRN:  [id number]     PCP:  Raman Coppola MD    Chief Complaint   Patient presents with    Follow-up     Cervicogenic headache recent visit with Dr Amrita Webber review of CT cervical spine and physical therapy          HISTORY OF PRESENT ILLNESS:  Patient presents today for 3-month follow-up, complains of ongoing issue with his day to day functionality. Patient and wife are still concerned that none of these issues are being addressed. Wife is very concerned as he is still having issues,concerns about the neuropsych testing/results. Example includes: Had been outside painting, but recently had trouble taking the curtain rods apart, which is unusual for him. Usually he can fix and take care of everything. Didn't complete recent tasks. Patient was recently to be outside placing grass seed down and his wife went to check and see if it completed the task, he noted he was done only for her to see the full bag of grass seed still in the garage. She notes this is very different for him. Patient is followed by a psychiatrist  at the [address] [place] [address] increased to 20mg daily about 3 months. Unsure if it is really helping anything. Recommended a wellness program at the [place], mindfulness, acupuncture. Starts the program tomorrow. Psychiatrist also feels like his ADHD is not being well controlled due to intolerability of higher dose Adderall, discussed considering different medications. He has completed PT for his neck, still has some stiffness. Has 4-5 sessions, wants to reschedule. Headaches are much better, had a tooth removed about 1 month ago, abscessed tooth that has hitting a nerve.    Dr Lizzie Bowser, checked labs fairly recently, taking B 12, checked thyroid, \"low normal\" did not feel like he needed to see an endocrinologist.    Neuropsych testing results:  Major depressive disorder, recurrent episode with anxious distress: F33.9  Attention deficit hyperactivity disorder, combined type: F90.2        REVIEW OF SYSTEMS:     Review of Systems   Constitutional:  Positive for malaise/fatigue. Eyes: Negative. Musculoskeletal:  Positive for neck pain. Neurological:  Negative for headaches. Psychiatric/Behavioral:  Positive for depression and memory loss (forgets simple tasks). The patient is nervous/anxious. The patient does not have insomnia (sleeps well with Trazodone and CPAP). All other systems reviewed and are negative. Current Outpatient Medications   Medication Sig    montelukast (SINGULAIR) 10 mg tablet Take 10 mg by mouth as needed. escitalopram oxalate (LEXAPRO) 10 mg tablet Take 10 mg by mouth daily. EPINEPHrine (EPIPEN) 0.3 mg/0.3 mL injection     thiamine HCL (B-1) 100 mg tablet Take 100 mg by mouth daily. folic acid (FOLVITE) 1 mg tablet Take 1 mg by mouth daily. amphetamine-dextroamphetamine XR (ADDERALL XR) 10 mg XR capsule Take 20 mg by mouth every morning. ascorbic acid, vitamin C, (VITAMIN C) 500 mg tablet Take 500 mg by mouth daily. ZINC PO Take 1 Tablet by mouth daily. cyanocobalamin (VITAMIN B12) 100 mcg tablet Take 100 mcg by mouth daily. cholecalciferol (VITAMIN D3) 25 mcg (1,000 unit) cap Take  by mouth daily. lidocaine (LIDODERM) 5 % by TransDERmal route every twenty-four (24) hours. Apply patch to the affected area for 12 hours a day and remove for 12 hours a day. atorvastatin (LIPITOR) 80 mg tablet Take 80 mg by mouth daily. gabapentin (NEURONTIN) 300 mg capsule Take 600 mg by mouth two (2) times a day. omeprazole (PRILOSEC) 40 mg capsule Take 40 mg by mouth daily. cetirizine (ZYRTEC) 10 mg tablet Take  by mouth daily. pramipexole (MIRAPEX) 0.5 mg tablet Take 0.5 mg by mouth nightly. trazodone (DESYREL) 50 mg tablet Take 150 mg by mouth. PV W-O NEW/FERROUS FUMARATE/FA (M-VIT PO) Take  by mouth. No current facility-administered medications for this visit.      Allergies   Allergen Reactions    Augmentin [Amoxicillin-Pot Clavulanate] Other (comments)     headaches    Coconut Oil Other (comments)    Aleve [Naproxen Sodium] Rash    Clindamycin Itching    Doxycycline Itching     Past Medical History:   Diagnosis Date    Arthritis     CAD (coronary artery disease)     Candidal esophagitis ([address] 2008    Chronic rhinitis     Foot drop, left     GERD (gastroesophageal reflux disease)     Headache(784.0)     Hypercholesterolemia     Nonbacterial arachnoiditis     Psychiatric disorder     ANXIETY ,DEPRESSION    PTSD (post-traumatic stress disorder)     RLS (restless legs syndrome)     Sleep apnea     CPAP     Past Surgical History:   Procedure Laterality Date    EGD  2008    HX COLONOSCOPY      HX HEENT  2006, 2007    nasal surgery x 2    HX ORTHOPAEDIC      LUMBAR SURGERY X 2    HX ORTHOPAEDIC Left     THUMB    HX ORTHOPAEDIC Right     THUMB TENDON REPAIR    HX WISDOM TEETH EXTRACTION  09/2022    NEUROLOGICAL PROCEDURE UNLISTED  2013    SPINE STIMULATOR     Social History     Socioeconomic History    Marital status:      Spouse name: Not on file    Number of children: Not on file    Years of education: Not on file    Highest education level: Not on file   Occupational History    Not on file   Tobacco Use    Smoking status: Every Day     Types: Cigars    Smokeless tobacco: Never    Tobacco comments:     SMOKES 3-7 CIGARETTES DAILY   Vaping Use    Vaping Use: Never used   Substance and Sexual Activity    Alcohol use: Not Currently    Drug use: Yes     Types: Marijuana     Comment: cbd gummies    Sexual activity: Not on file   Other Topics Concern    Not on file   Social History Narrative    Not on file     Social Determinants of Health     Financial Resource Strain: Not on file   Food Insecurity: Not on file   Transportation Needs: Not on file Physical Activity: Not on file   Stress: Not on file   Social Connections: Not on file   Intimate Partner Violence: Not on file   Housing Stability: Not on file     Family History   Problem Relation Age of Onset    No Known Problems Mother     Heart Disease Father         MI    Other Father         ANEURYSM     Suicide Sister     Kidney Disease Brother         2 TRANSPLANTS    Alcohol abuse Neg Hx      EXAM:  CT CERVICAL SPINE WITHOUT CONTRAST     INDICATION: Cervicogenic headache     COMPARISON: May 2020     CONTRAST:  None. TECHNIQUE: Multislice helical CT of the cervical spine was performed without  intravenous contrast administration. Sagittal and coronal reformats were  generated. CT dose reduction was achieved through use of a standardized  protocol tailored for this examination and automatic exposure control for dose  modulation. FINDINGS:     Straightening of the normal cervical lordosis. Mild disc height loss at C5-6 and  C6-7, with endplate osteophyte formation. Facet joints are aligned normally. No  fracture. Craniocervical junction is within normal limits. Cervical soft tissues  and imaged lung apices are normal.     C2-C3: Right and left uncovertebral joint arthropathy with mild right foraminal  stenosis. No significant canal stenosis. .     C3-C4: There is no spinal canal or neural foraminal stenosis. C4-C5: There is no spinal canal or neural foraminal stenosis. C5-C6: Intervertebral osteophyte formation and uncovertebral arthropathy with  mild bilateral foraminal stenosis. No significant canal stenosis. Algis Broaden C6-C7: Intervertebral disc osteophyte complex and uncovertebral arthropathy  result in mild bilateral foraminal stenosis. No significant canal stenosis. .     C7-T1: There is no spinal canal or neural foraminal stenosis. IMPRESSION  1. Mild cervical spine degenerative changes predominantly at C5-6 and C6-7.  2.  No significant cervical canal stenosis by CT.     PHYSICAL EXAMINATION:    Visit Vitals  /70 (BP 1 Location: Left upper arm, BP Patient Position: Sitting, BP Cuff Size: Large adult)   Pulse 66   Temp 97.8 °F (36.6 °C) (Temporal)   Resp 16   Ht 5' 10\" (1.778 m)   Wt 184 lb (83.5 kg)   SpO2 96%   BMI 26.40 kg/m²     General:  Well defined, nourished, and well groomed individual in no acute distress. Musculoskeletal:  Extremities revealed no edema and had full range of motion of joints. Psych:  Good mood and bright affect presents with his wife. NEUROLOGICAL EXAMINATION:     Mental Status:   Alert and oriented to person, place, and time with recent and remote memory intact. Attention span and concentration are normal. Clear speech. Fund of knowledge preserved. Cranial Nerves:   PERRLA. Visual fields were full  EOM: no evidence of nystagmus  Facial sensation:  normal and symmetric  Facial motor: normal and symmetric, no facial droop noted. Hearing intact  SCM strength intact  Tongue: midline without fasciculations    Motor Examination: Normal tone. 5/5 muscle strength in bilateral upper extremities. No muscle wasting, no twitching or fasciculation noted. Sensory exam:  Normal throughout to light touch in BUE. Coordination:   Finger to nose and rapid arm movement testing was normal.  No resting or intention tremor. Unable to assess Romberg, negative pronator drift. Gait and Station:  Wobbly, unable to do tandem walking. Normal arm swing. Reflexes:  DTRs 2+ in bilateral biceps, brachioradialis, patella . ASSESSMENT AND PLAN      ICD-10-CM ICD-9-CM    1. Balance disorder  R26.89 781.99 REFERRAL TO PHYSICAL THERAPY      2. Depression with anxiety  F41.8 300.4       3. Falls frequently  R29.6 V15.88 REFERRAL TO PHYSICAL THERAPY      4. Cervical spondylosis  M47.812 721.0 REFERRAL TO PHYSICAL THERAPY      5. Attention deficit hyperactivity disorder (ADHD), other type  F90.8 314.01         1.  Balance disorder: Physical therapy order has been modified to help with his balance, fall precautions recommended. -     REFERRAL TO PHYSICAL THERAPY  2. Depression with anxiety: Patient is being followed by psychiatrist at the [place], we reviewed the neuropsych testing results with patient and his wife and they note concerns about the results, encouraged the patient to discuss these results with the psychiatrist to see if there are any better medications that can help him. He is also to begin the mindfulness program at the [place] which could be greatly beneficial.  3. Falls frequently: Physical therapy has been ordered, fall precautions recommended. -     REFERRAL TO PHYSICAL THERAPY  4. Cervical spondylosis: Cervical MRI ordered at last visit reviewed with patient today, he does note improvements with the physical therapy, he is to continue physical therapy, new order placed. -     REFERRAL TO PHYSICAL THERAPY  5. Attention deficit hyperactivity disorder (ADHD), other type: This was identified on the neuropsych testing, he notes he was unable to tolerate higher dose of the Adderall, patient to discuss with his psychiatrist to see if there are any other options that can help with his ADHD. Patient and/or family verbalized understand of all instructions and all questions/concerns were addressed. Safety/side effects of medications discussed. Patient remains a complex patient secondary to polypharmacy, significant comorbid conditions, and use of high-risk medications which complicate the decision making process related to patient's neurologic diagnosis. I will check with Dr. Nickie Golden, he had him scheduled for follow-up testing May 2023, I can see him at the completion of this test, return to clinic sooner if needed.     Ruchi Yarbrough, FNP-BC

## 2023-03-07 ENCOUNTER — TRANSCRIBE ORDER (OUTPATIENT)
Dept: SCHEDULING | Age: 63
End: 2023-03-07

## 2023-03-07 DIAGNOSIS — R13.12 DYSPHAGIA, OROPHARYNGEAL: ICD-10-CM

## 2023-03-07 DIAGNOSIS — K21.9 ESOPHAGEAL REFLUX: Primary | ICD-10-CM

## 2023-03-07 DIAGNOSIS — J38.00 VOCAL CORD PARALYSIS: ICD-10-CM

## 2023-03-28 ENCOUNTER — HOSPITAL ENCOUNTER (OUTPATIENT)
Dept: GENERAL RADIOLOGY | Age: 63
Discharge: HOME | End: 2023-03-28
Payer: MEDICARE

## 2023-03-28 DIAGNOSIS — J38.00 VOCAL CORD PARALYSIS: ICD-10-CM

## 2023-03-28 DIAGNOSIS — R13.12 DYSPHAGIA, OROPHARYNGEAL: ICD-10-CM

## 2023-03-28 DIAGNOSIS — K21.9 ESOPHAGEAL REFLUX: ICD-10-CM

## 2023-03-28 PROCEDURE — 74230 X-RAY XM SWLNG FUNCJ C+: CPT

## 2023-03-28 PROCEDURE — 74011000250 HC RX REV CODE- 250: Performed by: OTOLARYNGOLOGY

## 2023-03-28 PROCEDURE — 92611 MOTION FLUOROSCOPY/SWALLOW: CPT

## 2023-03-28 RX ADMIN — BARIUM SULFATE 90 ML: 0.81 POWDER, FOR SUSPENSION ORAL at 10:24

## 2023-03-28 RX ADMIN — BARIUM SULFATE 25 ML: 400 SUSPENSION ORAL at 10:25

## 2023-03-28 RX ADMIN — BARIUM SULFATE 20 ML: 400 SUSPENSION ORAL at 10:26

## 2023-03-28 RX ADMIN — BARIUM SULFATE 20 ML: 400 PASTE ORAL at 10:27

## 2023-03-28 NOTE — PROGRESS NOTES
SPEECH PATHOLOGY MODIFIED BARIUM SWALLOW STUDY  Patient: Juan Carlos Castro (71 y.o. male)  1960   Date: 3/28/2023  Primary Diagnosis: Esophageal reflux [K21.9]  Vocal cord paralysis [J38.00]  Dysphagia, oropharyngeal [R13.12]       Precautions: aspiration       ASSESSMENT :  Based on the objective data described below, the patient presents with oropharyngeal sw function WFL, suspected esophageal dysfunction c/b slowed esophageal clearance with mid-esophageal stasis noted. Oral phase c/b WNL. Intact dentition. Pharyngeal phase c/b WNL. Trace flash penetration is observed with sequential sips thin via straw, which appears related to slowed esophageal clearance. Dorena Reasons aspiration is not observed and penetration is not observed with any other trials. Transient cricopharyngeal hypertrophy is noted. Following subsequent trials, there is note of slowed clearance and mild-mod stasis in mid esophageal region noted. Pt is sensate. This was not observed to backflow. PLAN :  Recommendations and Planned Interventions: At this time, recommend further esophageal assessment either via esophagram vs GI consult/EGD as medically appropriate. Will defer to referring physician. Discussed swallow safety precautions including slow rate, alternating solids and liquids, upright during and at least 30 minutes after PO intake, careful mastication, liquid wash, avoiding problematic foods, stop eating 2-3 hours before bedtime. Recommend cont PO diet (soft/regular/thin liquids) as tolerated. Pt and wife, Odilia Nayak, educated and express understanding. SUBJECTIVE:   Patient seen for outpt MBS, referred by ENT. Pt reports feeling of foods getting stuck in upper esophageal region with cough and expectoration. TVF paralysis is reported per ENT order. Pt with hx of dysphonia and voice tx. Pt also reports memory loss and requests SLP to educate wife, Odilia Nayak, as well.     MBS purpose and protocol discussed with pt prior to initiation of study. OBJECTIVE:     Past Medical History:   Diagnosis Date    Arthritis     CAD (coronary artery disease)     Candidal esophagitis ([facility] [address] 2008    Chronic rhinitis     Foot drop, left     GERD (gastroesophageal reflux disease)     Headache(784.0)     Hypercholesterolemia     Nonbacterial arachnoiditis     Psychiatric disorder     ANXIETY ,DEPRESSION    PTSD (post-traumatic stress disorder)     RLS (restless legs syndrome)     Sleep apnea     CPAP     Past Surgical History:   Procedure Laterality Date    EGD  2008    HX COLONOSCOPY      HX HEENT  2006, 2007    nasal surgery x 2    HX ORTHOPAEDIC      LUMBAR SURGERY X 2    HX ORTHOPAEDIC Left     THUMB    HX ORTHOPAEDIC Right     THUMB TENDON REPAIR    HX WISDOM TEETH EXTRACTION  09/2022    NEUROLOGICAL PROCEDURE UNLISTED  2013    SPINE STIMULATOR      Video Flouroscopic Procedures  [x] Lateral View   [] A-P View [] Scanned to level of Sternum    [x] Seated at 90 deg.   [] Other:    Presentation:   [x] Spoon   [x] Cup   [x] Straw   [] Syringe   [x] Consecutive Swallows  [] Other:    Consistencies:   [x] Ba+ liquid   [x] Ba+ liquid (nectar)   [x] Ba+ liquid (honey)     [x] Ba+ pudding   [] Ba+ crunched cookie   [x] Ba+ cracker   [x] Other: Pt controlled trials    Testing Discontinued: [] Due to:    Treatment Techniques Attempted     [] Head Turn: [] Right [] Left     [] Head Tilt: [] Right [] Left     [] Chin Down:  [] Thermal Sensitization:  [] Supraglottic Swallow:  [] Mendelson's Maneuver:  [] Other:    Results  Dysphagia Present:    [x] Yes  [] No    Ratings of Dysphagia:    [] Mild  [] Moderate [] Severe    Stages of Breakdown:   [] Oral [] Pharyngeal   [x] Esophageal    Aspiration: [] Yes    [] No  [] At Risk  [] Trace (<10%) [] Significant (>10%):     %  [x] Penetration:  Level of: flash, above TVF  Cough: [] Yes      [] No    Consistency Aspirated:  [] Thin Liquid   [] Nectar   [] Honey   [] Pureed     [] Mech-soft  [] Solid    Motility Problems with:  [] Lip Closure:   [] Sucking:   [] Mastication:   [] Bolus Formation:   [] Bolus Control:  [] A-P Transport:  [] Posterior Tongue Elevation:  [] Swallow Response (delayed):  [] Velopharyngeal Closure:  [] Epiglottic inversion  [] Laryngeal Elevation:  [] Laryngeal Adduction:  [x] Cricopharyngeal Relaxation:  [x] Esophageal Peristalsis:  [] Reflux:  [] Other:    Timing of Aspiration:  [] Before Swallow:  [] During Swallow:  [] After Swallow:    Transit Time Delay:  [] >1 Second  Oral  [] >1 Second Pharyngeal  [x] >20 Second Esophageal     Residuals:  [] Buccal Cavity   [] Velum/posterior pharyngeal wall  [] Valleculae  [] Pyriforms      COMMUNICATION/EDUCATION:   Patient and wife Evan Jennings, per pt's request, receptive of education regarding MBS results, diet recs, swallow safety precautions and recs for further assessment as medically indicated. Patient and wife   demonstrated Good understanding as evidenced by verbal responsiveness, asking questions, discussion.     Thank you for this referral.  Latisha Cameron M.S., CCC-SLP

## 2023-04-24 ENCOUNTER — TELEPHONE (OUTPATIENT)
Dept: NEUROLOGY | Age: 63
End: 2023-04-24

## 2023-09-08 ENCOUNTER — TRANSCRIBE ORDERS (OUTPATIENT)
Facility: HOSPITAL | Age: 63
End: 2023-09-08

## 2023-09-08 DIAGNOSIS — R91.8 LUNG NODULES: Primary | ICD-10-CM

## 2023-09-08 DIAGNOSIS — J98.4 DISEASE OF LUNG: ICD-10-CM

## 2023-09-28 ENCOUNTER — HOSPITAL ENCOUNTER (OUTPATIENT)
Facility: HOSPITAL | Age: 63
Discharge: HOME | End: 2023-09-28
Attending: INTERNAL MEDICINE
Payer: MEDICARE

## 2023-09-28 DIAGNOSIS — R91.8 LUNG NODULES: ICD-10-CM

## 2023-09-28 DIAGNOSIS — J98.4 DISEASE OF LUNG: ICD-10-CM

## 2023-09-28 PROCEDURE — 71250 CT THORAX DX C-: CPT

## 2024-10-07 ENCOUNTER — OFFICE VISIT (OUTPATIENT)
Age: 64
End: 2024-10-07
Payer: MEDICARE

## 2024-10-07 VITALS
HEART RATE: 62 BPM | RESPIRATION RATE: 18 BRPM | WEIGHT: 193 LBS | SYSTOLIC BLOOD PRESSURE: 122 MMHG | HEIGHT: 70 IN | DIASTOLIC BLOOD PRESSURE: 64 MMHG | OXYGEN SATURATION: 96 % | BODY MASS INDEX: 27.63 KG/M2

## 2024-10-07 DIAGNOSIS — G31.84 MILD COGNITIVE IMPAIRMENT: Primary | ICD-10-CM

## 2024-10-07 DIAGNOSIS — R41.3 MEMORY LOSS: ICD-10-CM

## 2024-10-07 DIAGNOSIS — R26.89 BALANCE DISORDER: ICD-10-CM

## 2024-10-07 PROCEDURE — 99214 OFFICE O/P EST MOD 30 MIN: CPT | Performed by: NURSE PRACTITIONER

## 2024-10-07 PROCEDURE — G8484 FLU IMMUNIZE NO ADMIN: HCPCS | Performed by: NURSE PRACTITIONER

## 2024-10-07 PROCEDURE — G8419 CALC BMI OUT NRM PARAM NOF/U: HCPCS | Performed by: NURSE PRACTITIONER

## 2024-10-07 PROCEDURE — 4004F PT TOBACCO SCREEN RCVD TLK: CPT | Performed by: NURSE PRACTITIONER

## 2024-10-07 PROCEDURE — 1123F ACP DISCUSS/DSCN MKR DOCD: CPT | Performed by: NURSE PRACTITIONER

## 2024-10-07 PROCEDURE — G8427 DOCREV CUR MEDS BY ELIG CLIN: HCPCS | Performed by: NURSE PRACTITIONER

## 2024-10-07 PROCEDURE — 3017F COLORECTAL CA SCREEN DOC REV: CPT | Performed by: NURSE PRACTITIONER

## 2024-10-07 RX ORDER — PRAMIPEXOLE DIHYDROCHLORIDE 1 MG/1
1 TABLET ORAL DAILY
COMMUNITY
Start: 2024-09-26

## 2024-10-07 RX ORDER — LISDEXAMFETAMINE DIMESYLATE 50 MG/1
50 CAPSULE ORAL EVERY MORNING
COMMUNITY
Start: 2024-09-18

## 2024-10-07 RX ORDER — DONEPEZIL HYDROCHLORIDE 5 MG/1
5 TABLET, FILM COATED ORAL NIGHTLY
Qty: 30 TABLET | Refills: 4 | Status: SHIPPED | OUTPATIENT
Start: 2024-10-07

## 2024-10-07 RX ORDER — DULOXETIN HYDROCHLORIDE 20 MG/1
20 CAPSULE, DELAYED RELEASE ORAL DAILY
COMMUNITY

## 2024-10-07 ASSESSMENT — PATIENT HEALTH QUESTIONNAIRE - PHQ9
2. FEELING DOWN, DEPRESSED OR HOPELESS: NOT AT ALL
SUM OF ALL RESPONSES TO PHQ QUESTIONS 1-9: 0
1. LITTLE INTEREST OR PLEASURE IN DOING THINGS: NOT AT ALL
SUM OF ALL RESPONSES TO PHQ9 QUESTIONS 1 & 2: 0
SUM OF ALL RESPONSES TO PHQ QUESTIONS 1-9: 0

## 2024-10-07 NOTE — PROGRESS NOTES
Chief Complaint   Patient presents with    Memory Loss     Had another neuro psych eval and was dx with MCI-     1. Have you been to the ER, urgent care clinic since your last visit?  Hospitalized since your last visit? No     2. Have you seen or consulted any other health care providers outside of the [facility] since your last visit?  Include any pap smears or colon screening.  [facility]

## 2024-10-07 NOTE — PROGRESS NOTES
[facility]  [address]  [address]  [place] [address]  Tel: [phone]  Fax: [phone]      Date:  10/07/24     Name:  YUSUF TALAVERA  :  1960  MRN:  [id number]     PCP:  Erasto Watson MD    Chief Complaint   Patient presents with    Memory Loss     Had another neuro psych eval and was dx with MCI-       HISTORY OF PRESENT ILLNESS:  Patient presents today for regular follow-up, last seen 2022. Notes his short term memory has worsened.  He is here today with his wife, who notes that she has noticed a progression of his memory loss.  They do note that [facility], testing was done by the same provider, findings were significantly worse.  [facility] ,Dr Gorman, monthly appointments.  Pt and wife note there has been no recent imaging, wonder if that would be beneficial to recheck.  Patient is not currently on any medications for memory loss, he is open to suggestions.    Recap from last visit :   1. Balance disorder: Physical therapy order has been modified to help with his balance, fall precautions recommended.  -     REFERRAL TO PHYSICAL THERAPY  2. Depression with anxiety: [facility], we reviewed the neuropsych testing results with patient and his wife and they note concerns about the results, encouraged the patient to discuss these results with the psychiatrist to see if there are any better medications that can help him.  He is also [facility] which could be greatly beneficial.  3. Falls frequently: Physical therapy has been ordered, fall precautions recommended.  -     REFERRAL TO PHYSICAL THERAPY  4. Cervical spondylosis: Cervical MRI ordered at last visit reviewed with patient today, he does note improvements with the physical therapy, he is to continue physical therapy, new order placed.  -     REFERRAL TO

## 2024-10-09 ENCOUNTER — TELEPHONE (OUTPATIENT)
Age: 64
End: 2024-10-09

## 2024-11-14 ENCOUNTER — HOSPITAL ENCOUNTER (OUTPATIENT)
Facility: HOSPITAL | Age: 64
Discharge: HOME | End: 2024-11-17
Payer: MEDICARE

## 2024-11-14 DIAGNOSIS — R26.89 BALANCE DISORDER: ICD-10-CM

## 2024-11-14 DIAGNOSIS — R41.3 MEMORY LOSS: ICD-10-CM

## 2024-11-14 DIAGNOSIS — G31.84 MILD COGNITIVE IMPAIRMENT: ICD-10-CM

## 2024-11-14 LAB — CREAT BLD-MCNC: 0.8 MG/DL (ref 0.6–1.3)

## 2024-11-14 PROCEDURE — 6360000004 HC RX CONTRAST MEDICATION: Performed by: NURSE PRACTITIONER

## 2024-11-14 PROCEDURE — 70470 CT HEAD/BRAIN W/O & W/DYE: CPT

## 2024-11-14 PROCEDURE — 82565 ASSAY OF CREATININE: CPT

## 2024-11-14 RX ORDER — IOPAMIDOL 755 MG/ML
100 INJECTION, SOLUTION INTRAVASCULAR
Status: COMPLETED | OUTPATIENT
Start: 2024-11-14 | End: 2024-11-14

## 2024-11-14 RX ADMIN — IOPAMIDOL 100 ML: 755 INJECTION, SOLUTION INTRAVENOUS at 13:37

## 2025-01-20 ENCOUNTER — TELEPHONE (OUTPATIENT)
Age: 65
End: 2025-01-20

## 2025-01-20 NOTE — TELEPHONE ENCOUNTER
Patient's wife, Mary Ellen, called stating that after taking the Donepezil, Pt's blood pressure has been high (top number 137) and another doctor wants to put him on blood pressure medication. Requested call back to discuss. Please advise. [phone]

## 2025-01-21 NOTE — TELEPHONE ENCOUNTER
Spoke with patients daughter, Mary Ellen, on PHI  Since patient started on Aricept his BP has increased. It always stayed around 126/72. Marge states patients cardiologist wants to start him on Amlodipine. Marge asked if Cymbalta could increase the BP. I advised per data it could. Marge states patient was switched from Lexapro to cymbalta around the same time he started the aricept. Patient has appt with that doctor next week. Marge is going to ask that the patient go back on the lexapro and monitor to see if bp goes back down.

## (undated) DEVICE — GOWN,AURORA,NON-REINFORCED,2XL: Brand: MEDLINE

## (undated) DEVICE — STRAP,POSITIONING,KNEE/BODY,FOAM,4X60": Brand: MEDLINE

## (undated) DEVICE — SOLUTION IV 250ML 0.9% SOD CHL CLR INJ FLX BG CONT PRT CLSR

## (undated) DEVICE — KIT,ANTI FOG,W/SPONGE & FLUID,SOFT PACK: Brand: MEDLINE

## (undated) DEVICE — TOWEL SURG W17XL27IN STD BLU COT NONFENESTRATED PREWASHED

## (undated) DEVICE — SOLUTION IV 1000ML 0.9% SOD CHL

## (undated) DEVICE — TUBING, SUCTION, 1/4" X 10', STRAIGHT: Brand: MEDLINE

## (undated) DEVICE — PACK,EENT,TURBAN DRAPE,PK II: Brand: MEDLINE

## (undated) DEVICE — PAD,EYE,1-5/8X2 5/8,STERILE,LF,1/PK: Brand: MEDLINE

## (undated) DEVICE — INFECTION CONTROL KIT SYS

## (undated) DEVICE — GARMENT,MEDLINE,DVT,INT,CALF,MED, GEN2: Brand: MEDLINE

## (undated) DEVICE — CODMAN® SURGICAL PATTIES 1" X 3" (2.54CM X 7.62CM): Brand: CODMAN®

## (undated) DEVICE — FIRM 4CM: Brand: NASOPORE

## (undated) DEVICE — SURGICAL PROCEDURE PACK BASIN MAJ SET CUST NO CAUT

## (undated) DEVICE — NEEDLE HYPO 25GA L1.5IN BLU POLYPR HUB S STL REG BVL STR

## (undated) DEVICE — SYR 10ML CTRL LR LCK NSAF LF --